# Patient Record
Sex: MALE | Race: BLACK OR AFRICAN AMERICAN | NOT HISPANIC OR LATINO | URBAN - METROPOLITAN AREA
[De-identification: names, ages, dates, MRNs, and addresses within clinical notes are randomized per-mention and may not be internally consistent; named-entity substitution may affect disease eponyms.]

---

## 2018-01-18 ENCOUNTER — INPATIENT (INPATIENT)
Facility: HOSPITAL | Age: 58
LOS: 6 days | Discharge: HOME | End: 2018-01-25
Attending: INTERNAL MEDICINE | Admitting: INTERNAL MEDICINE
Payer: COMMERCIAL

## 2018-01-18 VITALS
WEIGHT: 164.91 LBS | DIASTOLIC BLOOD PRESSURE: 61 MMHG | OXYGEN SATURATION: 95 % | TEMPERATURE: 98 F | HEIGHT: 66 IN | SYSTOLIC BLOOD PRESSURE: 119 MMHG | RESPIRATION RATE: 16 BRPM | HEART RATE: 99 BPM

## 2018-01-18 DIAGNOSIS — J02.9 ACUTE PHARYNGITIS, UNSPECIFIED: ICD-10-CM

## 2018-01-18 DIAGNOSIS — E11.9 TYPE 2 DIABETES MELLITUS WITHOUT COMPLICATIONS: ICD-10-CM

## 2018-01-18 DIAGNOSIS — N17.9 ACUTE KIDNEY FAILURE, UNSPECIFIED: ICD-10-CM

## 2018-01-18 DIAGNOSIS — I10 ESSENTIAL (PRIMARY) HYPERTENSION: ICD-10-CM

## 2018-01-18 LAB
ACETONE SERPL-MCNC: ABNORMAL
ALBUMIN SERPL ELPH-MCNC: 3.2 G/DL — LOW (ref 3.3–5)
ALP SERPL-CCNC: 99 U/L — SIGNIFICANT CHANGE UP (ref 40–120)
ALT FLD-CCNC: 61 U/L — SIGNIFICANT CHANGE UP (ref 12–78)
AMYLASE P1 CFR SERPL: 26 U/L — SIGNIFICANT CHANGE UP (ref 25–115)
ANION GAP SERPL CALC-SCNC: 17 MMOL/L — SIGNIFICANT CHANGE UP (ref 5–17)
ANISOCYTOSIS BLD QL: SIGNIFICANT CHANGE UP
APPEARANCE UR: CLEAR — SIGNIFICANT CHANGE UP
APTT BLD: 27.8 SEC — SIGNIFICANT CHANGE UP (ref 27.5–37.4)
AST SERPL-CCNC: 19 U/L — SIGNIFICANT CHANGE UP (ref 15–37)
BASOPHILS # BLD AUTO: 0 K/UL — SIGNIFICANT CHANGE UP (ref 0–0.2)
BASOPHILS NFR BLD AUTO: 0.3 % — SIGNIFICANT CHANGE UP (ref 0–2)
BILIRUB DIRECT SERPL-MCNC: 0.21 MG/DL — HIGH (ref 0.05–0.2)
BILIRUB INDIRECT FLD-MCNC: 0.5 MG/DL — SIGNIFICANT CHANGE UP (ref 0.2–1)
BILIRUB SERPL-MCNC: 0.7 MG/DL — SIGNIFICANT CHANGE UP (ref 0.2–1.2)
BILIRUB UR-MCNC: NEGATIVE — SIGNIFICANT CHANGE UP
BUN SERPL-MCNC: 24 MG/DL — HIGH (ref 7–23)
CALCIUM SERPL-MCNC: 9.6 MG/DL — SIGNIFICANT CHANGE UP (ref 8.5–10.1)
CHLORIDE SERPL-SCNC: 111 MMOL/L — HIGH (ref 96–108)
CO2 SERPL-SCNC: 23 MMOL/L — SIGNIFICANT CHANGE UP (ref 22–31)
COLOR SPEC: YELLOW — SIGNIFICANT CHANGE UP
CREAT SERPL-MCNC: 1.64 MG/DL — HIGH (ref 0.5–1.3)
DIFF PNL FLD: ABNORMAL
EOSINOPHIL # BLD AUTO: 0 K/UL — SIGNIFICANT CHANGE UP (ref 0–0.5)
EOSINOPHIL NFR BLD AUTO: 0 % — SIGNIFICANT CHANGE UP (ref 0–6)
FLUAV SPEC QL CULT: NEGATIVE — SIGNIFICANT CHANGE UP
FLUBV AG SPEC QL IA: NEGATIVE — SIGNIFICANT CHANGE UP
GLUCOSE BLDC GLUCOMTR-MCNC: 334 MG/DL — HIGH (ref 70–99)
GLUCOSE BLDC GLUCOMTR-MCNC: 358 MG/DL — HIGH (ref 70–99)
GLUCOSE BLDC GLUCOMTR-MCNC: 438 MG/DL — HIGH (ref 70–99)
GLUCOSE BLDC GLUCOMTR-MCNC: 549 MG/DL — CRITICAL HIGH (ref 70–99)
GLUCOSE SERPL-MCNC: 512 MG/DL — CRITICAL HIGH (ref 70–99)
GLUCOSE UR QL: 1000 MG/DL
HCT VFR BLD CALC: 49 % — SIGNIFICANT CHANGE UP (ref 39–50)
HGB BLD-MCNC: 15.4 G/DL — SIGNIFICANT CHANGE UP (ref 13–17)
HIV 1 & 2 AB SERPL IA.RAPID: SIGNIFICANT CHANGE UP
INR BLD: 1.1 RATIO — SIGNIFICANT CHANGE UP (ref 0.88–1.16)
KETONES UR-MCNC: ABNORMAL
LEUKOCYTE ESTERASE UR-ACNC: NEGATIVE — SIGNIFICANT CHANGE UP
LG PLATELETS BLD QL AUTO: SLIGHT — SIGNIFICANT CHANGE UP
LIDOCAIN IGE QN: 166 U/L — SIGNIFICANT CHANGE UP (ref 73–393)
LYMPHOCYTES # BLD AUTO: 0.7 K/UL — LOW (ref 1–3.3)
LYMPHOCYTES # BLD AUTO: 5.9 % — LOW (ref 13–44)
MACROCYTES BLD QL: SIGNIFICANT CHANGE UP
MCHC RBC-ENTMCNC: 22.5 PG — LOW (ref 27–34)
MCHC RBC-ENTMCNC: 31.5 GM/DL — LOW (ref 32–36)
MCV RBC AUTO: 71.5 FL — LOW (ref 80–100)
MONOCYTES # BLD AUTO: 0.6 K/UL — SIGNIFICANT CHANGE UP (ref 0–0.9)
MONOCYTES NFR BLD AUTO: 5.1 % — SIGNIFICANT CHANGE UP (ref 2–14)
NEUTROPHILS # BLD AUTO: 11.1 K/UL — HIGH (ref 1.8–7.4)
NEUTROPHILS NFR BLD AUTO: 88.6 % — HIGH (ref 43–77)
NITRITE UR-MCNC: NEGATIVE — SIGNIFICANT CHANGE UP
PH UR: 5 — SIGNIFICANT CHANGE UP (ref 5–8)
PLAT MORPH BLD: NORMAL — SIGNIFICANT CHANGE UP
PLATELET # BLD AUTO: 419 K/UL — HIGH (ref 150–400)
POLYCHROMASIA BLD QL SMEAR: SLIGHT — SIGNIFICANT CHANGE UP
POTASSIUM SERPL-MCNC: 4.8 MMOL/L — SIGNIFICANT CHANGE UP (ref 3.5–5.3)
POTASSIUM SERPL-SCNC: 4.8 MMOL/L — SIGNIFICANT CHANGE UP (ref 3.5–5.3)
PROT SERPL-MCNC: 9.3 GM/DL — HIGH (ref 6–8.3)
PROT UR-MCNC: 30 MG/DL
PROTHROM AB SERPL-ACNC: 12 SEC — SIGNIFICANT CHANGE UP (ref 9.8–12.7)
RBC # BLD: 6.85 M/UL — HIGH (ref 4.2–5.8)
RBC # FLD: 12.7 % — SIGNIFICANT CHANGE UP (ref 11–15)
RBC BLD AUTO: ABNORMAL
RBC CASTS # UR COMP ASSIST: ABNORMAL /HPF (ref 0–4)
SODIUM SERPL-SCNC: 151 MMOL/L — HIGH (ref 135–145)
SP GR SPEC: 1.01 — SIGNIFICANT CHANGE UP (ref 1.01–1.02)
UROBILINOGEN FLD QL: NEGATIVE MG/DL — SIGNIFICANT CHANGE UP
WBC # BLD: 12.5 K/UL — HIGH (ref 3.8–10.5)
WBC # FLD AUTO: 12.5 K/UL — HIGH (ref 3.8–10.5)

## 2018-01-18 PROCEDURE — 99223 1ST HOSP IP/OBS HIGH 75: CPT

## 2018-01-18 PROCEDURE — 93010 ELECTROCARDIOGRAM REPORT: CPT

## 2018-01-18 PROCEDURE — 99285 EMERGENCY DEPT VISIT HI MDM: CPT

## 2018-01-18 PROCEDURE — 71045 X-RAY EXAM CHEST 1 VIEW: CPT | Mod: 26

## 2018-01-18 RX ORDER — INSULIN LISPRO 100/ML
VIAL (ML) SUBCUTANEOUS
Qty: 0 | Refills: 0 | Status: DISCONTINUED | OUTPATIENT
Start: 2018-01-18 | End: 2018-01-19

## 2018-01-18 RX ORDER — SODIUM CHLORIDE 9 MG/ML
1000 INJECTION INTRAMUSCULAR; INTRAVENOUS; SUBCUTANEOUS ONCE
Qty: 0 | Refills: 0 | Status: COMPLETED | OUTPATIENT
Start: 2018-01-18 | End: 2018-01-18

## 2018-01-18 RX ORDER — DEXTROSE 50 % IN WATER 50 %
1 SYRINGE (ML) INTRAVENOUS ONCE
Qty: 0 | Refills: 0 | Status: DISCONTINUED | OUTPATIENT
Start: 2018-01-18 | End: 2018-01-25

## 2018-01-18 RX ORDER — HEPARIN SODIUM 5000 [USP'U]/ML
5000 INJECTION INTRAVENOUS; SUBCUTANEOUS EVERY 8 HOURS
Qty: 0 | Refills: 0 | Status: DISCONTINUED | OUTPATIENT
Start: 2018-01-18 | End: 2018-01-18

## 2018-01-18 RX ORDER — INSULIN HUMAN 100 [IU]/ML
14 INJECTION, SOLUTION SUBCUTANEOUS ONCE
Qty: 0 | Refills: 0 | Status: COMPLETED | OUTPATIENT
Start: 2018-01-18 | End: 2018-01-18

## 2018-01-18 RX ORDER — INSULIN HUMAN 100 [IU]/ML
12 INJECTION, SOLUTION SUBCUTANEOUS ONCE
Qty: 0 | Refills: 0 | Status: COMPLETED | OUTPATIENT
Start: 2018-01-18 | End: 2018-01-18

## 2018-01-18 RX ORDER — SODIUM CHLORIDE 9 MG/ML
1000 INJECTION INTRAMUSCULAR; INTRAVENOUS; SUBCUTANEOUS
Qty: 0 | Refills: 0 | Status: DISCONTINUED | OUTPATIENT
Start: 2018-01-18 | End: 2018-01-19

## 2018-01-18 RX ORDER — SODIUM CHLORIDE 9 MG/ML
2400 INJECTION INTRAMUSCULAR; INTRAVENOUS; SUBCUTANEOUS ONCE
Qty: 0 | Refills: 0 | Status: COMPLETED | OUTPATIENT
Start: 2018-01-18 | End: 2018-01-18

## 2018-01-18 RX ORDER — SODIUM CHLORIDE 9 MG/ML
1000 INJECTION, SOLUTION INTRAVENOUS
Qty: 0 | Refills: 0 | Status: DISCONTINUED | OUTPATIENT
Start: 2018-01-18 | End: 2018-01-25

## 2018-01-18 RX ORDER — AMLODIPINE BESYLATE 2.5 MG/1
2.5 TABLET ORAL DAILY
Qty: 0 | Refills: 0 | Status: DISCONTINUED | OUTPATIENT
Start: 2018-01-18 | End: 2018-01-25

## 2018-01-18 RX ORDER — DEXTROSE 50 % IN WATER 50 %
25 SYRINGE (ML) INTRAVENOUS ONCE
Qty: 0 | Refills: 0 | Status: DISCONTINUED | OUTPATIENT
Start: 2018-01-18 | End: 2018-01-25

## 2018-01-18 RX ORDER — DEXTROSE 50 % IN WATER 50 %
12.5 SYRINGE (ML) INTRAVENOUS ONCE
Qty: 0 | Refills: 0 | Status: DISCONTINUED | OUTPATIENT
Start: 2018-01-18 | End: 2018-01-25

## 2018-01-18 RX ORDER — ACETAMINOPHEN 500 MG
650 TABLET ORAL EVERY 6 HOURS
Qty: 0 | Refills: 0 | Status: DISCONTINUED | OUTPATIENT
Start: 2018-01-18 | End: 2018-01-25

## 2018-01-18 RX ORDER — GLUCAGON INJECTION, SOLUTION 0.5 MG/.1ML
1 INJECTION, SOLUTION SUBCUTANEOUS ONCE
Qty: 0 | Refills: 0 | Status: DISCONTINUED | OUTPATIENT
Start: 2018-01-18 | End: 2018-01-25

## 2018-01-18 RX ORDER — NYSTATIN 500MM UNIT
500000 POWDER (EA) MISCELLANEOUS
Qty: 0 | Refills: 0 | Status: DISCONTINUED | OUTPATIENT
Start: 2018-01-18 | End: 2018-01-25

## 2018-01-18 RX ORDER — INSULIN LISPRO 100/ML
VIAL (ML) SUBCUTANEOUS AT BEDTIME
Qty: 0 | Refills: 0 | Status: DISCONTINUED | OUTPATIENT
Start: 2018-01-18 | End: 2018-01-19

## 2018-01-18 RX ADMIN — SODIUM CHLORIDE 125 MILLILITER(S): 9 INJECTION INTRAMUSCULAR; INTRAVENOUS; SUBCUTANEOUS at 19:19

## 2018-01-18 RX ADMIN — INSULIN HUMAN 14 UNIT(S): 100 INJECTION, SOLUTION SUBCUTANEOUS at 15:10

## 2018-01-18 RX ADMIN — INSULIN HUMAN 12 UNIT(S): 100 INJECTION, SOLUTION SUBCUTANEOUS at 12:53

## 2018-01-18 RX ADMIN — AMLODIPINE BESYLATE 2.5 MILLIGRAM(S): 2.5 TABLET ORAL at 20:40

## 2018-01-18 RX ADMIN — Medication 4: at 20:40

## 2018-01-18 RX ADMIN — SODIUM CHLORIDE 1200 MILLILITER(S): 9 INJECTION INTRAMUSCULAR; INTRAVENOUS; SUBCUTANEOUS at 11:39

## 2018-01-18 RX ADMIN — Medication 500000 UNIT(S): at 23:53

## 2018-01-18 RX ADMIN — SODIUM CHLORIDE 1000 MILLILITER(S): 9 INJECTION INTRAMUSCULAR; INTRAVENOUS; SUBCUTANEOUS at 14:20

## 2018-01-18 NOTE — H&P ADULT - NSHPREVIEWOFSYSTEMS_GEN_ALL_CORE
Constitutional: +malaise   Skin: No rash.  Eyes: No recent vision problems or eye pain.  ENT: +sore throat, +congestion   Endocrine: No thyroid problems.  Cardiovascular: No chest pain or palpitations.  Respiratory: +productive cough, No shortness of breath  Gastrointestinal: +dark stools, No abdominal pain, nausea, vomiting, or diarrhea.  Genitourinary: No dysuria.  Musculoskeletal: No joint swelling.  Neurologic: No headache.

## 2018-01-18 NOTE — H&P ADULT - ASSESSMENT
58 y/o male with a recent diagnosis of HTN presents with sore throat and generalized weakness x 2 weeks likely secondary to bacterial pharyngitis. 58 y/o male with a recent diagnosis of HTN presents with sore throat and generalized weakness x 2 weeks likely secondary to bacterial pharyngitis.     IMPROVE VTE Individual Risk Assessment        RISK                                                          Points  [  ] Previous VTE                                                3  [  ] Thrombophilia                                             2  [  ] Lower limb paralysis                                   2        (unable to hold up >15 seconds)    [  ] Current Cancer                                            2         (within 6 months)  [  ] Immobilization > 24 hrs                              1  [  ] ICU/CCU stay > 24 hours                            1  [  ] Age > 60                                                    1  IMPROVE VTE Score _______0__

## 2018-01-18 NOTE — H&P ADULT - PROBLEM SELECTOR PLAN 1
- Admit to Medicine  - check BCx and UCx  - Start IV Levaquin 500mg - Admit to Medicine  - check BCx and UCx  - Rapid flu negative  - check RVP  - Start IV Levaquin 500mg

## 2018-01-18 NOTE — ED PROVIDER NOTE - ENMT, MLM
Airway patent, Nasal mucosa clear. Mouth with dry mucosa, +oral thrush. Throat has no vesicles, no oropharyngeal exudates and uvula is midline.

## 2018-01-18 NOTE — ED ADULT NURSE NOTE - OBJECTIVE STATEMENT
PT presented to the ed c/o of weakness and cold like symptoms x 2 weeks. Pt reported that he is been flu + and being treated for that. reported yellowish sticky mucus and sorethroat and dry mouth.

## 2018-01-18 NOTE — H&P ADULT - HISTORY OF PRESENT ILLNESS
56 y/o male with a recent diagnosis of HTN presents to ER c/o sore throat, flu-like symptoms and generalized weakness x 2 weeks. He saw his PMD about 1 week ago who prescribed him Ibuprofen and cough medicine OTC. He reports feeling worse than before with loss of appetite and greenish sputum. CXR clear. He states he was recently started on Amlodipine 2.5mg but does not really take it everyday. He denies any chest pain, SOB, palpitations or dizziness. No fever, chills, dysuria, abdominal pain, or n/v/d. +Sick contact at home (son).     In ER, he was found with blood sugar 512, +moderate acetone, +ketones in urine, and Cr 1.64

## 2018-01-18 NOTE — ED PROVIDER NOTE - OBJECTIVE STATEMENT
57 year old male presents today c/o two week history of generalized weakness, pt states that he did see his PMD thinking he had the flu, he was given cough medication, but c/o still not getting better (-) fevers or chills  (-) nausea or vomiting  (-) abdominal pains +sore throat +increased thirst +polyuria

## 2018-01-18 NOTE — H&P ADULT - PROBLEM SELECTOR PLAN 2
- new onset diabetes  - check premeal finger sticks and sliding scale coverage  - check A1C in AM  -

## 2018-01-18 NOTE — H&P ADULT - NSHPPHYSICALEXAM_GEN_ALL_CORE
GENERAL APPEARANCE: Well developed, well nourished, alert and cooperative, and appears to be in no acute distress.  HEAD: normocephalic.  EYES: PERRL, EOMI.   EARS: External auditory canals and tympanic membranes clear, hearing grossly intact.  Throat: +Uvula redness and whitish spots likely thrush   NECK: Neck supple  CARDIAC: S1, S2 regular, Rhythm is regular. There is no peripheral edema, cyanosis or pallor. Extremities are warm and well perfused. Capillary refill is less than 2 seconds. No carotid bruits.  LUNGS: decreased BS b/l  ABDOMEN: Positive bowel sounds. Soft, nondistended, nontender. No guarding or rebound. No masses.  EXTREMITIES: No significant deformity or joint abnormality. No edema. Peripheral pulses intact.   LOWER EXTREMITY: Examination of both feet reveals all toes to be normal in size and symmetry, normal range of motion, normal sensation with distal capillary filling of less than 2 seconds without tenderness, swelling, discoloration, nodules, weakness or deformity; examination of both ankles, knees, legs, and hips reveals normal range of motion, normal sensation without tenderness, swelling, discoloration, crepitus, weakness or deformity.  NEUROLOGICAL: CN II-XII intact. Strength and sensation symmetric and intact throughout.    SKIN: Skin normal color, texture and turgor with no lesions or eruptions.  PSYCHIATRIC: The mental examination revealed the patient was oriented to person, place, and time. The patient was able to demonstrate good judgement and reason, without hallucinations, abnormal affect or abnormal behaviors during the examination. Patient is not suicidal.

## 2018-01-18 NOTE — ED PROVIDER NOTE - CARE PLAN
Principal Discharge DX:	Diabetes  Secondary Diagnosis:	Oral thrush  Secondary Diagnosis:	Dehydration

## 2018-01-18 NOTE — ED ADULT TRIAGE NOTE - CHIEF COMPLAINT QUOTE
generalize weakness for 2 weeks worsening. Pt was treated for flu like symptoms 2 weeks ago. Pt c/o feeling dehydrated

## 2018-01-19 DIAGNOSIS — E87.0 HYPEROSMOLALITY AND HYPERNATREMIA: ICD-10-CM

## 2018-01-19 DIAGNOSIS — B37.0 CANDIDAL STOMATITIS: ICD-10-CM

## 2018-01-19 DIAGNOSIS — E11.65 TYPE 2 DIABETES MELLITUS WITH HYPERGLYCEMIA: ICD-10-CM

## 2018-01-19 LAB
ACETONE SERPL-MCNC: ABNORMAL
ACETONE SERPL-MCNC: NEGATIVE — SIGNIFICANT CHANGE UP
ANION GAP SERPL CALC-SCNC: 7 MMOL/L — SIGNIFICANT CHANGE UP (ref 5–17)
ANION GAP SERPL CALC-SCNC: 9 MMOL/L — SIGNIFICANT CHANGE UP (ref 5–17)
BASE EXCESS BLDV CALC-SCNC: 1.2 MMOL/L — SIGNIFICANT CHANGE UP (ref -2–2)
BASOPHILS # BLD AUTO: 0.1 K/UL — SIGNIFICANT CHANGE UP (ref 0–0.2)
BASOPHILS NFR BLD AUTO: 0.9 % — SIGNIFICANT CHANGE UP (ref 0–2)
BLOOD GAS COMMENTS, VENOUS: SIGNIFICANT CHANGE UP
BUN SERPL-MCNC: 21 MG/DL — SIGNIFICANT CHANGE UP (ref 7–23)
BUN SERPL-MCNC: 27 MG/DL — HIGH (ref 7–23)
CALCIUM SERPL-MCNC: 8.2 MG/DL — LOW (ref 8.5–10.1)
CALCIUM SERPL-MCNC: 9 MG/DL — SIGNIFICANT CHANGE UP (ref 8.5–10.1)
CHLORIDE SERPL-SCNC: 117 MMOL/L — HIGH (ref 96–108)
CHLORIDE SERPL-SCNC: 123 MMOL/L — HIGH (ref 96–108)
CHOLEST SERPL-MCNC: 175 MG/DL — SIGNIFICANT CHANGE UP (ref 10–199)
CO2 SERPL-SCNC: 28 MMOL/L — SIGNIFICANT CHANGE UP (ref 22–31)
CO2 SERPL-SCNC: 29 MMOL/L — SIGNIFICANT CHANGE UP (ref 22–31)
CREAT SERPL-MCNC: 1.05 MG/DL — SIGNIFICANT CHANGE UP (ref 0.5–1.3)
CREAT SERPL-MCNC: 1.56 MG/DL — HIGH (ref 0.5–1.3)
CULTURE RESULTS: SIGNIFICANT CHANGE UP
EOSINOPHIL # BLD AUTO: 0 K/UL — SIGNIFICANT CHANGE UP (ref 0–0.5)
EOSINOPHIL NFR BLD AUTO: 0 % — SIGNIFICANT CHANGE UP (ref 0–6)
GAS PNL BLDV: SIGNIFICANT CHANGE UP
GLUCOSE BLDC GLUCOMTR-MCNC: 100 MG/DL — HIGH (ref 70–99)
GLUCOSE BLDC GLUCOMTR-MCNC: 122 MG/DL — HIGH (ref 70–99)
GLUCOSE BLDC GLUCOMTR-MCNC: 155 MG/DL — HIGH (ref 70–99)
GLUCOSE BLDC GLUCOMTR-MCNC: 214 MG/DL — HIGH (ref 70–99)
GLUCOSE BLDC GLUCOMTR-MCNC: 380 MG/DL — HIGH (ref 70–99)
GLUCOSE BLDC GLUCOMTR-MCNC: 445 MG/DL — HIGH (ref 70–99)
GLUCOSE BLDC GLUCOMTR-MCNC: 457 MG/DL — CRITICAL HIGH (ref 70–99)
GLUCOSE BLDC GLUCOMTR-MCNC: 533 MG/DL — CRITICAL HIGH (ref 70–99)
GLUCOSE BLDC GLUCOMTR-MCNC: 544 MG/DL — CRITICAL HIGH (ref 70–99)
GLUCOSE BLDC GLUCOMTR-MCNC: 548 MG/DL — CRITICAL HIGH (ref 70–99)
GLUCOSE BLDC GLUCOMTR-MCNC: 558 MG/DL — CRITICAL HIGH (ref 70–99)
GLUCOSE BLDC GLUCOMTR-MCNC: 560 MG/DL — CRITICAL HIGH (ref 70–99)
GLUCOSE BLDC GLUCOMTR-MCNC: 78 MG/DL — SIGNIFICANT CHANGE UP (ref 70–99)
GLUCOSE BLDC GLUCOMTR-MCNC: 88 MG/DL — SIGNIFICANT CHANGE UP (ref 70–99)
GLUCOSE SERPL-MCNC: 550 MG/DL — CRITICAL HIGH (ref 70–99)
GLUCOSE SERPL-MCNC: 78 MG/DL — SIGNIFICANT CHANGE UP (ref 70–99)
HBA1C BLD-MCNC: >15.5 % — HIGH (ref 4–5.6)
HCO3 BLDV-SCNC: 28 MMOL/L — SIGNIFICANT CHANGE UP (ref 21–29)
HCT VFR BLD CALC: 41.2 % — SIGNIFICANT CHANGE UP (ref 39–50)
HDLC SERPL-MCNC: 50 MG/DL — SIGNIFICANT CHANGE UP (ref 40–125)
HGB BLD-MCNC: 12.5 G/DL — LOW (ref 13–17)
HOROWITZ INDEX BLDV+IHG-RTO: 21 — SIGNIFICANT CHANGE UP
LIPID PNL WITH DIRECT LDL SERPL: 102 MG/DL — SIGNIFICANT CHANGE UP
LYMPHOCYTES # BLD AUTO: 1.7 K/UL — SIGNIFICANT CHANGE UP (ref 1–3.3)
LYMPHOCYTES # BLD AUTO: 12.3 % — LOW (ref 13–44)
MAGNESIUM SERPL-MCNC: 2.4 MG/DL — SIGNIFICANT CHANGE UP (ref 1.6–2.6)
MAGNESIUM SERPL-MCNC: 2.5 MG/DL — SIGNIFICANT CHANGE UP (ref 1.6–2.6)
MCHC RBC-ENTMCNC: 21.3 PG — LOW (ref 27–34)
MCHC RBC-ENTMCNC: 30.3 GM/DL — LOW (ref 32–36)
MCV RBC AUTO: 70.2 FL — LOW (ref 80–100)
MONOCYTES # BLD AUTO: 1.3 K/UL — HIGH (ref 0–0.9)
MONOCYTES NFR BLD AUTO: 9.9 % — SIGNIFICANT CHANGE UP (ref 2–14)
NEUTROPHILS # BLD AUTO: 10.3 K/UL — HIGH (ref 1.8–7.4)
NEUTROPHILS NFR BLD AUTO: 76.8 % — SIGNIFICANT CHANGE UP (ref 43–77)
PCO2 BLDV: 58 MMHG — HIGH (ref 35–50)
PH BLDV: 7.31 — LOW (ref 7.35–7.45)
PHOSPHATE SERPL-MCNC: 1.2 MG/DL — LOW (ref 2.5–4.5)
PHOSPHATE SERPL-MCNC: 2.4 MG/DL — LOW (ref 2.5–4.5)
PLATELET # BLD AUTO: 378 K/UL — SIGNIFICANT CHANGE UP (ref 150–400)
PO2 BLDV: 50 MMHG — HIGH (ref 25–45)
POTASSIUM SERPL-MCNC: 3.2 MMOL/L — LOW (ref 3.5–5.3)
POTASSIUM SERPL-MCNC: 4.5 MMOL/L — SIGNIFICANT CHANGE UP (ref 3.5–5.3)
POTASSIUM SERPL-SCNC: 3.2 MMOL/L — LOW (ref 3.5–5.3)
POTASSIUM SERPL-SCNC: 4.5 MMOL/L — SIGNIFICANT CHANGE UP (ref 3.5–5.3)
RBC # BLD: 5.87 M/UL — HIGH (ref 4.2–5.8)
RBC # FLD: 12.8 % — SIGNIFICANT CHANGE UP (ref 11–15)
SAO2 % BLDV: 80 % — SIGNIFICANT CHANGE UP (ref 67–88)
SODIUM SERPL-SCNC: 154 MMOL/L — HIGH (ref 135–145)
SODIUM SERPL-SCNC: 159 MMOL/L — HIGH (ref 135–145)
SPECIMEN SOURCE: SIGNIFICANT CHANGE UP
T3 SERPL-MCNC: 71 NG/DL — LOW (ref 80–200)
T4 AB SER-ACNC: 5.4 UG/DL — SIGNIFICANT CHANGE UP (ref 4.6–12)
TOTAL CHOLESTEROL/HDL RATIO MEASUREMENT: 3.5 RATIO — SIGNIFICANT CHANGE UP (ref 3.4–9.6)
TRIGL SERPL-MCNC: 113 MG/DL — SIGNIFICANT CHANGE UP (ref 10–149)
TROPONIN I SERPL-MCNC: 0.03 NG/ML — SIGNIFICANT CHANGE UP (ref 0.01–0.04)
TROPONIN I SERPL-MCNC: 0.03 NG/ML — SIGNIFICANT CHANGE UP (ref 0.01–0.04)
TSH SERPL-MCNC: 0.12 UU/ML — LOW (ref 0.36–3.74)
WBC # BLD: 13.4 K/UL — HIGH (ref 3.8–10.5)
WBC # FLD AUTO: 13.4 K/UL — HIGH (ref 3.8–10.5)

## 2018-01-19 PROCEDURE — 99233 SBSQ HOSP IP/OBS HIGH 50: CPT

## 2018-01-19 RX ORDER — INSULIN HUMAN 100 [IU]/ML
7 INJECTION, SOLUTION SUBCUTANEOUS
Qty: 0 | Refills: 0 | Status: DISCONTINUED | OUTPATIENT
Start: 2018-01-19 | End: 2018-01-25

## 2018-01-19 RX ORDER — INSULIN LISPRO 100/ML
VIAL (ML) SUBCUTANEOUS
Qty: 0 | Refills: 0 | Status: DISCONTINUED | OUTPATIENT
Start: 2018-01-19 | End: 2018-01-25

## 2018-01-19 RX ORDER — SODIUM CHLORIDE 9 MG/ML
1000 INJECTION INTRAMUSCULAR; INTRAVENOUS; SUBCUTANEOUS ONCE
Qty: 0 | Refills: 0 | Status: COMPLETED | OUTPATIENT
Start: 2018-01-19 | End: 2018-01-19

## 2018-01-19 RX ORDER — INSULIN HUMAN 100 [IU]/ML
10 INJECTION, SOLUTION SUBCUTANEOUS ONCE
Qty: 0 | Refills: 0 | Status: COMPLETED | OUTPATIENT
Start: 2018-01-19 | End: 2018-01-19

## 2018-01-19 RX ORDER — INSULIN GLARGINE 100 [IU]/ML
20 INJECTION, SOLUTION SUBCUTANEOUS AT BEDTIME
Qty: 0 | Refills: 0 | Status: DISCONTINUED | OUTPATIENT
Start: 2018-01-19 | End: 2018-01-25

## 2018-01-19 RX ORDER — INSULIN GLARGINE 100 [IU]/ML
20 INJECTION, SOLUTION SUBCUTANEOUS ONCE
Qty: 0 | Refills: 0 | Status: COMPLETED | OUTPATIENT
Start: 2018-01-19 | End: 2018-01-19

## 2018-01-19 RX ORDER — SODIUM CHLORIDE 9 MG/ML
1000 INJECTION INTRAMUSCULAR; INTRAVENOUS; SUBCUTANEOUS
Qty: 0 | Refills: 0 | Status: DISCONTINUED | OUTPATIENT
Start: 2018-01-19 | End: 2018-01-19

## 2018-01-19 RX ORDER — SODIUM CHLORIDE 9 MG/ML
1000 INJECTION, SOLUTION INTRAVENOUS
Qty: 0 | Refills: 0 | Status: DISCONTINUED | OUTPATIENT
Start: 2018-01-19 | End: 2018-01-20

## 2018-01-19 RX ORDER — INSULIN LISPRO 100/ML
VIAL (ML) SUBCUTANEOUS
Qty: 0 | Refills: 0 | Status: DISCONTINUED | OUTPATIENT
Start: 2018-01-19 | End: 2018-01-19

## 2018-01-19 RX ORDER — PNEUMOCOCCAL 23-VAL P-SAC VAC 25MCG/0.5
0.5 VIAL (ML) INJECTION ONCE
Qty: 0 | Refills: 0 | Status: COMPLETED | OUTPATIENT
Start: 2018-01-19 | End: 2018-01-19

## 2018-01-19 RX ADMIN — Medication 2: at 04:56

## 2018-01-19 RX ADMIN — Medication 12: at 21:21

## 2018-01-19 RX ADMIN — Medication 12: at 17:30

## 2018-01-19 RX ADMIN — Medication 500000 UNIT(S): at 21:22

## 2018-01-19 RX ADMIN — INSULIN GLARGINE 20 UNIT(S): 100 INJECTION, SOLUTION SUBCUTANEOUS at 21:21

## 2018-01-19 RX ADMIN — Medication 500000 UNIT(S): at 06:51

## 2018-01-19 RX ADMIN — AMLODIPINE BESYLATE 2.5 MILLIGRAM(S): 2.5 TABLET ORAL at 05:40

## 2018-01-19 RX ADMIN — INSULIN HUMAN 10 UNIT(S): 100 INJECTION, SOLUTION SUBCUTANEOUS at 01:30

## 2018-01-19 RX ADMIN — Medication 4: at 04:00

## 2018-01-19 RX ADMIN — Medication 4: at 00:09

## 2018-01-19 RX ADMIN — SODIUM CHLORIDE 125 MILLILITER(S): 9 INJECTION, SOLUTION INTRAVENOUS at 17:55

## 2018-01-19 RX ADMIN — SODIUM CHLORIDE 150 MILLILITER(S): 9 INJECTION INTRAMUSCULAR; INTRAVENOUS; SUBCUTANEOUS at 04:01

## 2018-01-19 RX ADMIN — SODIUM CHLORIDE 2000 MILLILITER(S): 9 INJECTION INTRAMUSCULAR; INTRAVENOUS; SUBCUTANEOUS at 01:53

## 2018-01-19 RX ADMIN — INSULIN GLARGINE 20 UNIT(S): 100 INJECTION, SOLUTION SUBCUTANEOUS at 03:46

## 2018-01-19 NOTE — PROGRESS NOTE ADULT - SUBJECTIVE AND OBJECTIVE BOX
CHIEF COMPLAINT/INTERVAL HISTORY:    Patient is a 57y old  Male who presents with a chief complaint of sore throat and general weakness (2018 19:31)      HPI:  58 y/o male with a recent diagnosis of HTN presents to ER c/o sore throat, flu-like symptoms and generalized weakness x 2 weeks. He saw his PMD about 1 week ago who prescribed him Ibuprofen and cough medicine OTC. He reports feeling worse than before with loss of appetite and greenish sputum. CXR clear. He states he was recently started on Amlodipine 2.5mg but does not really take it everyday. He denies any chest pain, SOB, palpitations or dizziness. No fever, chills, dysuria, abdominal pain, or n/v/d. +Sick contact at home (son).     In ER, he was found with blood sugar 512, +moderate acetone, +ketones in urine, and Cr 1.64 (2018 19:31)    Overnight issues  patient still complain of sore throat   feeling stronger   SUBJECTIVE & OBJECTIVE: Pt seen and examined at bedside.   ROS:  CONSTITUTIONAL: No fever, weight loss, or fatigue  NECK: No pain or stiffness POSITIVE sore throat   RESPIRATORY: No cough, wheezing, chills or hemoptysis; No shortness of breath  CARDIOVASCULAR: No chest pain, palpitations, dizziness, or leg swelling  GASTROINTESTINAL: No abdominal or epigastric pain. No nausea, vomiting, or hematemesis; No diarrhea or constipation. No melena or hematochezia.  GENITOURINARY: No dysuria, frequency, hematuria, or incontinence  NEUROLOGICAL: No headaches, memory loss, loss of strength, numbness, or tremors  SKIN: No itching, burning, rashes, or lesions   ICU Vital Signs Last 24 Hrs  T(C): 36.2 (2018 05:44), Max: 37.1 (2018 19:39)  T(F): 97.2 (2018 05:44), Max: 98.7 (2018 19:39)  HR: 62 (2018 05:44) (62 - 99)  BP: 137/75 (2018 05:44) (119/61 - 168/92)  BP(mean): --  ABP: --  ABP(mean): --  RR: 16 (2018 05:44) (16 - 18)  SpO2: 95% (2018 05:44) (93% - 96%)        MEDICATIONS  (STANDING):  amLODIPine   Tablet 2.5 milliGRAM(s) Oral daily  dextrose 5%. 1000 milliLiter(s) (50 mL/Hr) IV Continuous <Continuous>  dextrose 50% Injectable 12.5 Gram(s) IV Push once  dextrose 50% Injectable 25 Gram(s) IV Push once  dextrose 50% Injectable 25 Gram(s) IV Push once  insulin glargine Injectable (LANTUS) 20 Unit(s) SubCutaneous at bedtime  insulin lispro (HumaLOG) corrective regimen sliding scale   SubCutaneous every 1 hour  levoFLOXacin IVPB      levoFLOXacin IVPB 500 milliGRAM(s) IV Intermittent every 24 hours  nystatin    Suspension 878983 Unit(s) Oral two times a day  pneumococcal  23 Vaccine (PNEUMOVAX) 0.5 milliLiter(s) IntraMuscular once  sodium chloride 0.45%. 1000 milliLiter(s) (125 mL/Hr) IV Continuous <Continuous>    MEDICATIONS  (PRN):  acetaminophen   Tablet 650 milliGRAM(s) Oral every 6 hours PRN For Temp greater than 38 C (100.4 F)  acetaminophen   Tablet. 650 milliGRAM(s) Oral every 6 hours PRN Mild Pain (1 - 3)  dextrose Gel 1 Dose(s) Oral once PRN Blood Glucose LESS THAN 70 milliGRAM(s)/deciliter  glucagon  Injectable 1 milliGRAM(s) IntraMuscular once PRN Glucose LESS THAN 70 milligrams/deciliter        PHYSICAL EXAM:    GENERAL: NAD, well-groomed, well-developed  HEAD:  Atraumatic, Normocephalic  EYES: EOMI, PERRLA, conjunctiva and sclera clear  ENMT: Moist mucous membranes pharngeal erythema no adenopathy  NECK: Supple, No JVD  NERVOUS SYSTEM:  Alert & Oriented X3, Motor Strength 5/5 B/L upper and lower extremities; DTRs 2+ intact and symmetric  CHEST/LUNG: Clear to auscultation bilaterally; No rales, rhonchi, wheezing, or rubs  HEART: Regular rate and rhythm; No murmurs, rubs, or gallops  ABDOMEN: Soft, Nontender, Nondistended; Bowel sounds present  EXTREMITIES:  2+ Peripheral Pulses, No clubbing, cyanosis, or edema    LABS:                        12.5   13.4  )-----------( 378      ( 2018 08:32 )             41.2         154<H>  |  117<H>  |  27<H>  ----------------------------<  550<HH>  4.5   |  28  |  1.56<H>    Ca    9.0      2018 01:16  Phos  2.4       Mg     2.4         TPro  9.3<H>  /  Alb  3.2<L>  /  TBili  0.7  /  DBili  .21<H>  /  AST  19  /  ALT  61  /  AlkPhos  99      PT/INR - ( 2018 11:35 )   PT: 12.0 sec;   INR: 1.10 ratio         PTT - ( 2018 11:35 )  PTT:27.8 sec  Urinalysis Basic - ( 2018 12:06 )    Color: Yellow / Appearance: Clear / S.015 / pH: x  Gluc: x / Ketone: Large  / Bili: Negative / Urobili: Negative mg/dL   Blood: x / Protein: 30 mg/dL / Nitrite: Negative   Leuk Esterase: Negative / RBC: 6-10 /HPF / WBC x   Sq Epi: x / Non Sq Epi: x / Bacteria: x        CAPILLARY BLOOD GLUCOSE      POCT Blood Glucose.: 78 mg/dL (2018 07:57)  POCT Blood Glucose.: 100 mg/dL (2018 06:54)  POCT Blood Glucose.: 122 mg/dL (2018 05:39)  POCT Blood Glucose.: 155 mg/dL (2018 04:55)  POCT Blood Glucose.: 214 mg/dL (2018 03:50)  POCT Blood Glucose.: 380 mg/dL (2018 02:32)  POCT Blood Glucose.: 560 mg/dL (2018 01:16)  POCT Blood Glucose.: 558 mg/dL (2018 01:14)  POCT Blood Glucose.: 548 mg/dL (2018 23:59)  POCT Blood Glucose.: 549 mg/dL (2018 23:56)  POCT Blood Glucose.: 334 mg/dL (2018 20:34)  POCT Blood Glucose.: 358 mg/dL (2018 15:53)  POCT Blood Glucose.: 438 mg/dL (2018 14:20)      RECENT CULTURES:      RADIOLOGY & ADDITIONAL TESTS:  Imaging Personally Reviewed:  [ ] YES      Consultant(s) Notes Reviewed:  [ ] YES     Care Discussed with [ ] Consultants [X ] Patient [ ] Family  [x ]    [x ]  Other; RN  HEALTH ISSUES - PROBLEM Dx:  Oral thrush: Oral thrush  Type 2 diabetes mellitus with hyperglycemia, without long-term current use of insulin: Type 2 diabetes mellitus with hyperglycemia, without long-term current use of insulin  Hypertension: Hypertension  PHILLIP (acute kidney injury): PHILLIP (acute kidney injury)  Diabetes: Diabetes  Pharyngitis with viral syndrome: Pharyngitis with viral syndrome        DVT/GI ppx  Discussed with pt @ bedside

## 2018-01-19 NOTE — CONSULT NOTE ADULT - SUBJECTIVE AND OBJECTIVE BOX
Patient is a 57y old  Male who presents with a chief complaint of sore throat and general weakness (18 Jan 2018 19:31)      Reason For Consult:  hyperglycemia     HPI:  58 y/o male with a recent diagnosis of HTN presents to ER c/o sore throat, flu-like symptoms and generalized weakness x 2 weeks. He saw his PMD about 1 week ago who prescribed him Ibuprofen and cough medicine OTC. He reports feeling worse than before with loss of appetite and greenish sputum. CXR clear. He states he was recently started on Amlodipine 2.5mg but does not really take it everyday. He denies any chest pain, SOB, palpitations or dizziness. No fever, chills, dysuria, abdominal pain, or n/v/d. +Sick contact at home (son).     In ER, he was found with blood sugar 512, +moderate acetone, +ketones in urine, and Cr 1.64 (18 Jan 2018 19:31)  patient was on insulin drip with better control   now again with finger sticks much increased   on Lantus 20 units and Lispro sliding scale coverage with meals   on Levaquin     PAST MEDICAL & SURGICAL HISTORY:  Hypertension  No significant past surgical history      FAMILY HISTORY:  No pertinent family history in first degree relatives        Social History:    MEDICATIONS  (STANDING):  amLODIPine   Tablet 2.5 milliGRAM(s) Oral daily  dextrose 5%. 1000 milliLiter(s) (50 mL/Hr) IV Continuous <Continuous>  dextrose 50% Injectable 12.5 Gram(s) IV Push once  dextrose 50% Injectable 25 Gram(s) IV Push once  dextrose 50% Injectable 25 Gram(s) IV Push once  insulin glargine Injectable (LANTUS) 20 Unit(s) SubCutaneous at bedtime  insulin lispro (HumaLOG) corrective regimen sliding scale   SubCutaneous Before meals and at bedtime  insulin regular  human recombinant 7 Unit(s) SubCutaneous before breakfast  insulin regular  human recombinant 7 Unit(s) SubCutaneous before lunch  insulin regular  human recombinant 7 Unit(s) SubCutaneous before dinner  levoFLOXacin IVPB      levoFLOXacin IVPB 500 milliGRAM(s) IV Intermittent every 24 hours  nystatin    Suspension [id number] Unit(s) Oral two times a day  pneumococcal  23 Vaccine (PNEUMOVAX) 0.5 milliLiter(s) IntraMuscular once  sodium chloride 0.45%. 1000 milliLiter(s) (125 mL/Hr) IV Continuous <Continuous>    MEDICATIONS  (PRN):  acetaminophen   Tablet 650 milliGRAM(s) Oral every 6 hours PRN For Temp greater than 38 C (100.4 F)  acetaminophen   Tablet. 650 milliGRAM(s) Oral every 6 hours PRN Mild Pain (1 - 3)  dextrose Gel 1 Dose(s) Oral once PRN Blood Glucose LESS THAN 70 milliGRAM(s)/deciliter  glucagon  Injectable 1 milliGRAM(s) IntraMuscular once PRN Glucose LESS THAN 70 milligrams/deciliter      REVIEW OF SYSTEMS:  CONSTITUTIONAL:  as per HPI  HEENT:  Eyes:  No diplopia or blurred vision. ENT:  No earache, sore throat or runny nose.  CARDIOVASCULAR:  No pressure, squeezing, strangling, tightness, heaviness or aching about the chest, neck, axilla or epigastrium.  RESPIRATORY:  No cough, shortness of breath, PND or orthopnea.  GASTROINTESTINAL:  No nausea, vomiting or diarrhea.  GENITOURINARY:  No dysuria, frequency or urgency. No Blood in urine  MUSCULOSKELETAL:  no joint aches, no muscle pain, myalgia  SKIN:  No change in skin, hair or nails.  NEUROLOGIC:  No paresthesias, fasciculations, seizures or weakness.  PSYCHIATRIC:  No disorder of thought or mood.  ENDOCRINE:  No heat or cold intolerance, polyuria or polydipsia. abnormal weight gain or loss, oral thrush  HEMATOLOGICAL:  No easy bruising or bleeding.     T(C): 36.7 (01-19-18 @ 17:26), Max: 37.1 (01-18-18 @ 23:26)  HR: 75 (01-19-18 @ 17:26) (62 - 75)  BP: 155/89 (01-19-18 @ 17:26) (137/75 - 164/83)  RR: 17 (01-19-18 @ 17:26) (16 - 18)  SpO2: 98% (01-19-18 @ 17:26) (93% - 98%)  Wt(kg): --    PHYSICAL EXAM:  GENERAL: NAD, well-groomed, well-developed  HEAD:  Atraumatic, Normocephalic  EYES: EOMI, PERRLA, conjunctiva and sclera clear  ENMT: No tonsillar erythema, exudates, or enlargement; Moist mucous membranes, Good dentition, No lesions  NECK: Supple, No JVD, Normal thyroid  NERVOUS SYSTEM:  Alert & Oriented X3, Good concentration; Motor Strength 5/5 B/L upper and lower extremities; DTRs 2+ intact and symmetric  CHEST/LUNG: Clear to percussion bilaterally; No rales, rhonchi, wheezing, or rubs  HEART: Regular rate and rhythm; No murmurs, rubs, or gallops  ABDOMEN: Soft, Nontender, Nondistended; Bowel sounds present  EXTREMITIES:  2+ Peripheral Pulses, No clubbing, cyanosis, or edema  LYMPH: No lymphadenopathy noted  SKIN: No rashes or lesions    CAPILLARY BLOOD GLUCOSE      POCT Blood Glucose.: 457 mg/dL (19 Jan 2018 21:07)  POCT Blood Glucose.: 445 mg/dL (19 Jan 2018 21:06)  POCT Blood Glucose.: 533 mg/dL (19 Jan 2018 16:55)  POCT Blood Glucose.: 544 mg/dL (19 Jan 2018 16:53)  POCT Blood Glucose.: 88 mg/dL (19 Jan 2018 09:12)  POCT Blood Glucose.: 78 mg/dL (19 Jan 2018 07:57)  POCT Blood Glucose.: 100 mg/dL (19 Jan 2018 06:54)  POCT Blood Glucose.: 122 mg/dL (19 Jan 2018 05:39)  POCT Blood Glucose.: 155 mg/dL (19 Jan 2018 04:55)  POCT Blood Glucose.: 214 mg/dL (19 Jan 2018 03:50)  POCT Blood Glucose.: 380 mg/dL (19 Jan 2018 02:32)  POCT Blood Glucose.: 560 mg/dL (19 Jan 2018 01:16)  POCT Blood Glucose.: 558 mg/dL (19 Jan 2018 01:14)  POCT Blood Glucose.: 548 mg/dL (18 Jan 2018 23:59)  POCT Blood Glucose.: 549 mg/dL (18 Jan 2018 23:56)                            12.5   13.4  )-----------( 378      ( 19 Jan 2018 08:32 )             41.2       CMP:  01-19 @ 08:32  SGPT --  Albumin --   Alk Phos --   Anion Gap 7   SGOT --   Total Bili --   BUN 21   Calcium Total 8.2   CO2 29   Chloride 123   Creatinine 1.05   eGFR if AA 92   eGFR if non AA 80   Glucose 78   Potassium 3.2   Protein --   Sodium 159      Thyroid Function Tests:  01-19 @ 09:50 TSH -- FreeT4 -- T3 71 Anti TPO -- Anti Thyroglobulin Ab -- TSI --  01-19 @ 08:32 TSH 0.123 FreeT4 -- T3 -- Anti TPO -- Anti Thyroglobulin Ab -- TSI --      Diabetes Tests: 01-19 @ 09:50 HbA1c >15.5 C-Peptide --       Parathyroids:     Adrenals:       Radiology:

## 2018-01-19 NOTE — ED ADULT NURSE REASSESSMENT NOTE - NS ED NURSE REASSESS COMMENT FT1
Patient , repeat 549, REBECCA Hutchinson made aware Patient , repeat 548, REBECCA Hutchinson made aware

## 2018-01-19 NOTE — CONSULT NOTE ADULT - PROBLEM SELECTOR RECOMMENDATION 9
Continue with the same regimen while inpatient   add prandial lispro 7 units   may need Metformin added as patient may have increased Insulin Resistance   Check C-Peptide before doing that   glucose toxicity increased   Thank You for the courtesy of this consultation !!!

## 2018-01-19 NOTE — CONSULT NOTE ADULT - SUBJECTIVE AND OBJECTIVE BOX
Patient is a 57y old  Male who presents with a chief complaint of sore throat and general weakness (2018 19:31)      HPI:  58 y/o male with a recent diagnosis of HTN presents to ER c/o sore throat, flu-like symptoms and generalized weakness x 2 weeks. He saw his PMD about 1 week ago who prescribed him Ibuprofen and cough medicine OTC. He reports feeling worse than before with loss of appetite and greenish sputum. CXR clear. He states he was recently started on Amlodipine 2.5mg but does not really take it everyday. He denies any chest pain, SOB, palpitations or dizziness. No fever, chills, dysuria, abdominal pain, or n/v/d. +Sick contact at home (son).     In ER, he was found with blood sugar 438, +moderate acetone, +ketones in urine, and Cr 1.64 (2018 19:31). Patient denies family history of diabetes or prior history of hyperglycemia. Patient received 2 liters normal saline and 10 units insulin IVP when consult called      Allergies    No Known Allergies            MEDICATIONS  (STANDING):  amLODIPine   Tablet 2.5 milliGRAM(s) Oral daily    insulin glargine Injectable (LANTUS) 20 Unit(s) SubCutaneous at bedtime ordered to start   evening  insulin glargine Injectable (LANTUS) 20 Unit(s) SubCutaneous once ordered stat now  insulin lispro (HumaLOG) corrective regimen sliding scale   SubCutaneous every 1 hour  insulin regular  human recombinant. 10 Unit(s) IV Push once ordered now  levoFLOXacin IVPB      levoFLOXacin IVPB 500 milliGRAM(s) IV Intermittent every 24 hours  nystatin    Suspension 729601 Unit(s) Oral two times a day  sodium chloride 0.9%. 1000 milliLiter(s) (150 mL/Hr) IV Continuous <Continuous>      Drug Dosing Weight  Height (cm): 167.64 (2018 10:13)  Weight (kg): 74.8 (2018 10:13)  BMI (kg/m2): 26.6 (2018 10:13)  BSA (m2): 1.84 (2018 10:13)    PAST MEDICAL & SURGICAL HISTORY:  Hypertension  No significant past surgical history      FAMILY HISTORY:  No pertinent family history in first degree relatives      SOCIAL HISTORY: nonsmoker    ADVANCE DIRECTIVES: none    REVIEW OF SYSTEMS      General:	malaise, denies weight loss    Skin/Breast: denies poorly healing wounds  	  Ophthalmologic: denies blurred vision  	  ENMT:	+ sore throat    Respiratory and Thorax: denies cough  	  Cardiovascular:	denies palpitations, denies chest pain    Gastrointestinal:	 denies N/v/d    Genitourinary:	denies    Neurological:	denies parasthesias      Endocrine:	increased thirst          ICU Vital Signs Last 24 Hrs  T(C): 36.6 (2018 01:43), Max: 37.1 (2018 19:39)  T(F): 97.9 (2018 01:43), Max: 98.7 (2018 19:39)  HR: 62 (2018 01:43) (62 - 99)  BP: 153/73 (2018 01:43) (119/61 - 168/92)  BP(mean): --  ABP: --  ABP(mean): --  RR: 18 (2018 01:43) (16 - 18)  SpO2: 93% (2018 01:43) (93% - 96%)    Current fingesrtick 538 mg/dl  Diet: strict carbohydrates      PHYSICAL EXAM:      Constitutional: alert and orientated, nontoxic in appearance, breathing non labored    Eyes:  EOMI, fundoscopic no acute changes    ENMT: pharyx with thrush    Neck: supple    Respiratory: bilateral air entry    Cardiovascular: S1/s2 EKG HR 64, normal axis, nonspecific ST changes III, AVF, V3    Gastrointestinal: benign      Extremities: FROM      Psychiatric: alert        LABS:  CBC Full  -  ( 2018 11:35 )  WBC Count : 12.5 K/uL  Hemoglobin : 15.4 g/dL  Hematocrit : 49.0 %  Platelet Count - Automated : 419 K/uL  Mean Cell Volume : 71.5 fl  Mean Cell Hemoglobin : 22.5 pg  Mean Cell Hemoglobin Concentration : 31.5 gm/dL  Auto Neutrophil # : 11.1 K/uL  Auto Lymphocyte # : 0.7 K/uL  Auto Monocyte # : 0.6 K/uL  Auto Eosinophil # : 0.0 K/uL  Auto Basophil # : 0.0 K/uL  Auto Neutrophil % : 88.6 %  Auto Lymphocyte % : 5.9 %  Auto Monocyte % : 5.1 %  Auto Eosinophil % : 0.0 %  Auto Basophil % : 0.3 %        154<H>  |  117<H>  |  27<H>  ----------------------------<  550<HH>  4.5   |  28  |  1.56<H>    Ca    9.0      2018 01:16  Phos  2.4       Mg     2.4         TPro  9.3<H>  /  Alb  3.2<L>  /  TBili  0.7  /  DBili  .21<H>  /  AST  19  /  ALT  61  /  AlkPhos  99      CAPILLARY BLOOD GLUCOSE      POCT Blood Glucose.: 560 mg/dL (2018 01:16)    lowest POCT Blood Glucose 334 mg/dl (2018  20:34)    PT/INR - ( 2018 11:35 )   PT: 12.0 sec;   INR: 1.10 ratio         PTT - ( 2018 11:35 )  PTT:27.8 sec  Urinalysis Basic - ( 2018 12:06 )    Color: Yellow / Appearance: Clear / S.015 / pH: x  Gluc: x / Ketone: Large  / Bili: Negative / Urobili: Negative mg/dL   Blood: x / Protein: 30 mg/dL / Nitrite: Negative   Leuk Esterase: Negative / RBC: 6-10 /HPF / WBC x   Sq Epi: x / Non Sq Epi: x / Bacteria: x    Venous BG 7.31 BE 1.2  HIV negative      RADIOLOGY: Chest Xray no acute infiltrates    CRITICAL CARE TIME SPENT: 30 minutes

## 2018-01-19 NOTE — CONSULT NOTE ADULT - PROBLEM SELECTOR RECOMMENDATION 9
Patient cleared for medical floor. FS q1h. Start Lantus, Continue IVF  New Onset. Obtain endocrine consult. Monitor Mg, PH q6h  Obtain Troponins for nonspecific ST changes  Pneumovax, flu vaccine prior to discharge

## 2018-01-19 NOTE — PROGRESS NOTE ADULT - ASSESSMENT
58 y/o male with a recent diagnosis of HTN presents with sore throat and generalized weakness x 2 weeks likely secondary to bacterial pharyngitis.     IMPROVE VTE Individual Risk Assessment        RISK                                                          Points  [  ] Previous VTE                                                3  [  ] Thrombophilia                                             2  [  ] Lower limb paralysis                                   2        (unable to hold up >15 seconds)    [  ] Current Cancer                                            2         (within 6 months)  [  ] Immobilization > 24 hrs                              1  [  ] ICU/CCU stay > 24 hours                            1  [  ] Age > 60                                                    1  IMPROVE VTE Score _______0__

## 2018-01-20 LAB
ANION GAP SERPL CALC-SCNC: 9 MMOL/L — SIGNIFICANT CHANGE UP (ref 5–17)
BUN SERPL-MCNC: 15 MG/DL — SIGNIFICANT CHANGE UP (ref 7–23)
C PEPTIDE SERPL-MCNC: 0.3 NG/ML — LOW (ref 0.9–7.1)
CALCIUM SERPL-MCNC: 7.9 MG/DL — LOW (ref 8.5–10.1)
CHLORIDE SERPL-SCNC: 108 MMOL/L — SIGNIFICANT CHANGE UP (ref 96–108)
CO2 SERPL-SCNC: 28 MMOL/L — SIGNIFICANT CHANGE UP (ref 22–31)
CREAT SERPL-MCNC: 0.95 MG/DL — SIGNIFICANT CHANGE UP (ref 0.5–1.3)
GLUCOSE BLDC GLUCOMTR-MCNC: 223 MG/DL — HIGH (ref 70–99)
GLUCOSE BLDC GLUCOMTR-MCNC: 318 MG/DL — HIGH (ref 70–99)
GLUCOSE BLDC GLUCOMTR-MCNC: 397 MG/DL — HIGH (ref 70–99)
GLUCOSE BLDC GLUCOMTR-MCNC: 425 MG/DL — HIGH (ref 70–99)
GLUCOSE BLDC GLUCOMTR-MCNC: 462 MG/DL — CRITICAL HIGH (ref 70–99)
GLUCOSE SERPL-MCNC: 242 MG/DL — HIGH (ref 70–99)
HCT VFR BLD CALC: 39.4 % — SIGNIFICANT CHANGE UP (ref 39–50)
HGB BLD-MCNC: 12.9 G/DL — LOW (ref 13–17)
MAGNESIUM SERPL-MCNC: 1.7 MG/DL — SIGNIFICANT CHANGE UP (ref 1.6–2.6)
MCHC RBC-ENTMCNC: 23.2 PG — LOW (ref 27–34)
MCHC RBC-ENTMCNC: 32.7 GM/DL — SIGNIFICANT CHANGE UP (ref 32–36)
MCV RBC AUTO: 70.8 FL — LOW (ref 80–100)
PHOSPHATE SERPL-MCNC: 2.7 MG/DL — SIGNIFICANT CHANGE UP (ref 2.5–4.5)
PLATELET # BLD AUTO: 302 K/UL — SIGNIFICANT CHANGE UP (ref 150–400)
POTASSIUM SERPL-MCNC: 3.5 MMOL/L — SIGNIFICANT CHANGE UP (ref 3.5–5.3)
POTASSIUM SERPL-SCNC: 3.5 MMOL/L — SIGNIFICANT CHANGE UP (ref 3.5–5.3)
RBC # BLD: 5.56 M/UL — SIGNIFICANT CHANGE UP (ref 4.2–5.8)
RBC # FLD: 12.6 % — SIGNIFICANT CHANGE UP (ref 11–15)
SODIUM SERPL-SCNC: 145 MMOL/L — SIGNIFICANT CHANGE UP (ref 135–145)
WBC # BLD: 11.4 K/UL — HIGH (ref 3.8–10.5)
WBC # FLD AUTO: 11.4 K/UL — HIGH (ref 3.8–10.5)

## 2018-01-20 PROCEDURE — 99232 SBSQ HOSP IP/OBS MODERATE 35: CPT

## 2018-01-20 RX ORDER — SENNA PLUS 8.6 MG/1
2 TABLET ORAL AT BEDTIME
Qty: 0 | Refills: 0 | Status: DISCONTINUED | OUTPATIENT
Start: 2018-01-20 | End: 2018-01-25

## 2018-01-20 RX ADMIN — INSULIN HUMAN 7 UNIT(S): 100 INJECTION, SOLUTION SUBCUTANEOUS at 10:58

## 2018-01-20 RX ADMIN — SENNA PLUS 2 TABLET(S): 8.6 TABLET ORAL at 21:36

## 2018-01-20 RX ADMIN — Medication 500000 UNIT(S): at 21:36

## 2018-01-20 RX ADMIN — Medication 10: at 17:16

## 2018-01-20 RX ADMIN — Medication 8: at 10:57

## 2018-01-20 RX ADMIN — Medication 4: at 08:02

## 2018-01-20 RX ADMIN — INSULIN HUMAN 7 UNIT(S): 100 INJECTION, SOLUTION SUBCUTANEOUS at 08:51

## 2018-01-20 RX ADMIN — AMLODIPINE BESYLATE 2.5 MILLIGRAM(S): 2.5 TABLET ORAL at 06:05

## 2018-01-20 RX ADMIN — Medication 12: at 21:37

## 2018-01-20 RX ADMIN — SODIUM CHLORIDE 125 MILLILITER(S): 9 INJECTION, SOLUTION INTRAVENOUS at 10:14

## 2018-01-20 RX ADMIN — INSULIN HUMAN 7 UNIT(S): 100 INJECTION, SOLUTION SUBCUTANEOUS at 17:16

## 2018-01-20 RX ADMIN — INSULIN GLARGINE 20 UNIT(S): 100 INJECTION, SOLUTION SUBCUTANEOUS at 21:36

## 2018-01-20 NOTE — PROGRESS NOTE ADULT - SUBJECTIVE AND OBJECTIVE BOX
Patient is a 57y old  Male who presents with a chief complaint of sore throat and general weakness (18 Jan 2018 19:31)        HPI:  56 y/o male with a recent diagnosis of HTN presents to ER c/o sore throat, flu-like symptoms and generalized weakness x 2 weeks. He saw his PMD about 1 week ago who prescribed him Ibuprofen and cough medicine OTC. He reports feeling worse than before with loss of appetite and greenish sputum. CXR clear. He states he was recently started on Amlodipine 2.5mg but does not really take it everyday. He denies any chest pain, SOB, palpitations or dizziness. No fever, chills, dysuria, abdominal pain, or n/v/d. +Sick contact at home (son).     In ER, he was found with blood sugar 512, +moderate acetone, +ketones in urine, and Cr 1.64 (18 Jan 2018 19:31)      1/20 SUBJECTIVE & OBJECTIVE: Pt seen and examined at bedside.  No acute events overnight.  Requesting to be left alone.     PHYSICAL EXAM:  T(C): 36.8 (01-20-18 @ 11:37), Max: 37.3 (01-20-18 @ 00:02)  HR: 78 (01-20-18 @ 11:37) (70 - 78)  BP: 157/91 (01-20-18 @ 11:37) (151/86 - 159/91)  RR: 17 (01-20-18 @ 11:37) (17 - 18)  SpO2: 95% (01-20-18 @ 11:37) (94% - 100%)    GENERAL: NAD, well-groomed, well-developed  HEAD:  Atraumatic, Normocephalic  EYES: EOMI, PERRLA, conjunctiva and sclera clear  ENMT: Moist mucous membranes  NECK: Supple, No JVD  NERVOUS SYSTEM:  Alert & Oriented X3, Motor Strength 5/5 B/L upper and lower extremities; DTRs 2+ intact and symmetric  CHEST/LUNG: Clear to auscultation bilaterally; No rales, rhonchi, wheezing, or rubs  HEART: Regular rate and rhythm; No murmurs, rubs, or gallops  ABDOMEN: Soft, Nontender, Nondistended; Bowel sounds present  EXTREMITIES:  2+ Peripheral Pulses, No clubbing, cyanosis, or edema        MEDICATIONS  (STANDING):  amLODIPine   Tablet 2.5 milliGRAM(s) Oral daily  dextrose 5%. 1000 milliLiter(s) (50 mL/Hr) IV Continuous <Continuous>  dextrose 50% Injectable 12.5 Gram(s) IV Push once  dextrose 50% Injectable 25 Gram(s) IV Push once  dextrose 50% Injectable 25 Gram(s) IV Push once  insulin glargine Injectable (LANTUS) 20 Unit(s) SubCutaneous at bedtime  insulin lispro (HumaLOG) corrective regimen sliding scale   SubCutaneous Before meals and at bedtime  insulin regular  human recombinant 7 Unit(s) SubCutaneous before breakfast  insulin regular  human recombinant 7 Unit(s) SubCutaneous before lunch  insulin regular  human recombinant 7 Unit(s) SubCutaneous before dinner  levoFLOXacin IVPB      levoFLOXacin IVPB 500 milliGRAM(s) IV Intermittent every 24 hours  nystatin    Suspension 893234 Unit(s) Oral two times a day  pneumococcal  23 Vaccine (PNEUMOVAX) 0.5 milliLiter(s) IntraMuscular once    MEDICATIONS  (PRN):  acetaminophen   Tablet 650 milliGRAM(s) Oral every 6 hours PRN For Temp greater than 38 C (100.4 F)  acetaminophen   Tablet. 650 milliGRAM(s) Oral every 6 hours PRN Mild Pain (1 - 3)  dextrose Gel 1 Dose(s) Oral once PRN Blood Glucose LESS THAN 70 milliGRAM(s)/deciliter  glucagon  Injectable 1 milliGRAM(s) IntraMuscular once PRN Glucose LESS THAN 70 milligrams/deciliter      LABS:                        12.9   11.4  )-----------( 302      ( 20 Jan 2018 07:50 )             39.4     01-    145  |  108  |  15  ----------------------------<  242<H>  3.5   |  28  |  0.95    Calcium    7.9<L>      20 Jan 2018 07:50  Phosphorous  2.7     01-20  Magneisum = 1.7     01-20    Magnesium, Serum: 1.7 mg/dL (01-20 @ 07:50)    POCT Blood Glucose.: 318 mg/dL (20 Jan 2018 10:57)  POCT Blood Glucose.: 223 mg/dL (20 Jan 2018 08:01)  POCT Blood Glucose.: 457 mg/dL (19 Jan 2018 21:07)  POCT Blood Glucose.: 445 mg/dL (19 Jan 2018 21:06)  POCT Blood Glucose.: 533 mg/dL (19 Jan 2018 16:55)  POCT Blood Glucose.: 544 mg/dL (19 Jan 2018 16:53)    POCT Blood Glucose.: 318 mg/dL (20 Jan 2018 10:57)  POCT Blood Glucose.: 223 mg/dL (20 Jan 2018 08:01)  POCT Blood Glucose.: 457 mg/dL (19 Jan 2018 21:07)  POCT Blood Glucose.: 445 mg/dL (19 Jan 2018 21:06)  POCT Blood Glucose.: 533 mg/dL (19 Jan 2018 16:55)  POCT Blood Glucose.: 544 mg/dL (19 Jan 2018 16:53)      POCT Blood Glucose.: 318 mg/dL (20 Jan 2018 10:57)      CARDIAC MARKERS ( 19 Jan 2018 08:35 )  .034 ng/mL / x     / x     / x     / x      CARDIAC MARKERS ( 19 Jan 2018 01:16 )  .030 ng/mL / x     / x     / x     / x        DVT/GI pophylaxsis  Discussed with pt @ bedside

## 2018-01-20 NOTE — PROGRESS NOTE ADULT - PROBLEM SELECTOR PLAN 3
- likely from dehydration   - s/p about 2L IVF in ER   - c/w 1/2NS @ 125cc/hr
- likely from dehydration   - s/p about 2L IVF in ER   - now resolved  - can D/C IV fluids

## 2018-01-20 NOTE — PROGRESS NOTE ADULT - ASSESSMENT
58 y/o male with a recent diagnosis of HTN presents with sore throat and generalized weakness x 2 weeks likely secondary to bacterial pharyngitis.

## 2018-01-20 NOTE — PROGRESS NOTE ADULT - PROBLEM SELECTOR PLAN 4
- continue with Amlodipine 2.5mg   - low sodium diet
- continue with Amlodipine 2.5mg   - low sodium diet

## 2018-01-20 NOTE — PROGRESS NOTE ADULT - PROBLEM SELECTOR PLAN 2
- new onset diabetes mellitus   - check premeal finger sticks and insulin sliding scale coverage  - Hgb A1C = 15.5 n 1/19  - Appreciate endo eval :  continue with current insulin regimen  add prandial lispro 7 units   may need Metformin added as patient may have increased Insulin Resistance   Check C-Peptide before doing that   glucose toxicity increased - new onset diabetes mellitus   - check premeal finger sticks and insulin sliding scale coverage  - Hgb A1C = 15.5 n 1/19  - Appreciate endo eval :  continue with current insulin regimen ISS + Lantus 20 units Qhs  add prandial lispro 7 units   may need Metformin added as patient may have increased Insulin Resistance   Check C-Peptide before doing that   glucose toxicity increased

## 2018-01-20 NOTE — PROGRESS NOTE ADULT - SUBJECTIVE AND OBJECTIVE BOX
Patient is a 57y old  Male who presents with a chief complaint of sore throat and general weakness (18 Jan 2018 19:31)      Interval History: finger sticks are slightly decreased   prandial lispro 7 units added yesterday   patient with increased Insulin Resistance and infection       MEDICATIONS  (STANDING):  amLODIPine   Tablet 2.5 milliGRAM(s) Oral daily  dextrose 5%. 1000 milliLiter(s) (50 mL/Hr) IV Continuous <Continuous>  dextrose 50% Injectable 12.5 Gram(s) IV Push once  dextrose 50% Injectable 25 Gram(s) IV Push once  dextrose 50% Injectable 25 Gram(s) IV Push once  insulin glargine Injectable (LANTUS) 20 Unit(s) SubCutaneous at bedtime  insulin lispro (HumaLOG) corrective regimen sliding scale   SubCutaneous Before meals and at bedtime  insulin regular  human recombinant 7 Unit(s) SubCutaneous before breakfast  insulin regular  human recombinant 7 Unit(s) SubCutaneous before lunch  insulin regular  human recombinant 7 Unit(s) SubCutaneous before dinner  levoFLOXacin IVPB      levoFLOXacin IVPB 500 milliGRAM(s) IV Intermittent every 24 hours  nystatin    Suspension 525270 Unit(s) Oral two times a day  pneumococcal  23 Vaccine (PNEUMOVAX) 0.5 milliLiter(s) IntraMuscular once    MEDICATIONS  (PRN):  acetaminophen   Tablet 650 milliGRAM(s) Oral every 6 hours PRN For Temp greater than 38 C (100.4 F)  acetaminophen   Tablet. 650 milliGRAM(s) Oral every 6 hours PRN Mild Pain (1 - 3)  dextrose Gel 1 Dose(s) Oral once PRN Blood Glucose LESS THAN 70 milliGRAM(s)/deciliter  glucagon  Injectable 1 milliGRAM(s) IntraMuscular once PRN Glucose LESS THAN 70 milligrams/deciliter      Allergies    No Known Allergies    Intolerances        REVIEW OF SYSTEMS:  CONSTITUTIONAL:   EYES: No eye pain, visual disturbances, or discharge  ENMT:  No difficulty hearing, tinnitus, vertigo; No sinus or throat pain  NECK: No pain or stiffness  RESPIRATORY: No cough, wheezing, chills or hemoptysis; No shortness of breath  CARDIOVASCULAR: No chest pain, palpitations, dizziness, or leg swelling  GASTROINTESTINAL: No abdominal or epigastric pain. No nausea, vomiting, or hematemesis; No diarrhea or constipation. No melena or hematochezia.  GENITOURINARY: No dysuria, frequency, hematuria, or incontinence  NEUROLOGICAL: No headaches, memory loss, loss of strength, numbness, or tremors  SKIN: No itching, burning, rashes, or lesions   LYMPH NODES: No enlarged glands  ENDOCRINE: No heat or cold intolerance; No hair loss  MUSCULOSKELETAL: No joint pain or swelling; No muscle, back, or extremity pain  PSYCHIATRIC: No depression, anxiety, mood swings, or difficulty sleeping  HEME/LYMPH: No easy bruising, or bleeding gums  ALLERY AND IMMUNOLOGIC: No hives or eczema    Vital Signs Last 24 Hrs  T(C): 37.6 (20 Jan 2018 17:21), Max: 37.6 (20 Jan 2018 17:21)  T(F): 99.6 (20 Jan 2018 17:21), Max: 99.6 (20 Jan 2018 17:21)  HR: 78 (20 Jan 2018 17:21) (70 - 78)  BP: 150/86 (20 Jan 2018 17:21) (150/86 - 159/91)  BP(mean): --  RR: 18 (20 Jan 2018 17:21) (17 - 18)  SpO2: 92% (20 Jan 2018 17:21) (92% - 100%)    PHYSICAL EXAM:  GENERAL: deferred     LABS:        CAPILLARY BLOOD GLUCOSE      POCT Blood Glucose.: 397 mg/dL (20 Jan 2018 16:03)  POCT Blood Glucose.: 318 mg/dL (20 Jan 2018 10:57)  POCT Blood Glucose.: 223 mg/dL (20 Jan 2018 08:01)  POCT Blood Glucose.: 457 mg/dL (19 Jan 2018 21:07)  POCT Blood Glucose.: 445 mg/dL (19 Jan 2018 21:06)    Lipid panel:   CARDIAC MARKERS ( 19 Jan 2018 08:35 )  .034 ng/mL / x     / x     / x     / x      CARDIAC MARKERS ( 19 Jan 2018 01:16 )  .030 ng/mL / x     / x     / x     / x              Thyroid:  Diabetes Tests:  Parathyroid Panel:  Adrenals:  RADIOLOGY & ADDITIONAL TESTS:    Imaging Personally Reviewed:  [ ] YES  [ ] NO    Consultant(s) Notes Reviewed:  [ ] YES  [ ] NO    Care Discussed with Consultants/Other Providers [ ] YES  [ ] NO

## 2018-01-21 LAB
ANION GAP SERPL CALC-SCNC: 11 MMOL/L — SIGNIFICANT CHANGE UP (ref 5–17)
ANION GAP SERPL CALC-SCNC: 9 MMOL/L — SIGNIFICANT CHANGE UP (ref 5–17)
BUN SERPL-MCNC: 14 MG/DL — SIGNIFICANT CHANGE UP (ref 7–23)
BUN SERPL-MCNC: 18 MG/DL — SIGNIFICANT CHANGE UP (ref 7–23)
CALCIUM SERPL-MCNC: 8 MG/DL — LOW (ref 8.5–10.1)
CALCIUM SERPL-MCNC: 8.3 MG/DL — LOW (ref 8.5–10.1)
CHLORIDE SERPL-SCNC: 102 MMOL/L — SIGNIFICANT CHANGE UP (ref 96–108)
CHLORIDE SERPL-SCNC: 103 MMOL/L — SIGNIFICANT CHANGE UP (ref 96–108)
CO2 SERPL-SCNC: 27 MMOL/L — SIGNIFICANT CHANGE UP (ref 22–31)
CO2 SERPL-SCNC: 28 MMOL/L — SIGNIFICANT CHANGE UP (ref 22–31)
CREAT SERPL-MCNC: 0.99 MG/DL — SIGNIFICANT CHANGE UP (ref 0.5–1.3)
CREAT SERPL-MCNC: 1.02 MG/DL — SIGNIFICANT CHANGE UP (ref 0.5–1.3)
GLUCOSE BLDC GLUCOMTR-MCNC: 179 MG/DL — HIGH (ref 70–99)
GLUCOSE BLDC GLUCOMTR-MCNC: 292 MG/DL — HIGH (ref 70–99)
GLUCOSE BLDC GLUCOMTR-MCNC: 304 MG/DL — HIGH (ref 70–99)
GLUCOSE BLDC GLUCOMTR-MCNC: 344 MG/DL — HIGH (ref 70–99)
GLUCOSE SERPL-MCNC: 169 MG/DL — HIGH (ref 70–99)
GLUCOSE SERPL-MCNC: 363 MG/DL — HIGH (ref 70–99)
HCT VFR BLD CALC: 43.4 % — SIGNIFICANT CHANGE UP (ref 39–50)
HGB BLD-MCNC: 13.9 G/DL — SIGNIFICANT CHANGE UP (ref 13–17)
MAGNESIUM SERPL-MCNC: 2 MG/DL — SIGNIFICANT CHANGE UP (ref 1.6–2.6)
MCHC RBC-ENTMCNC: 22.4 PG — LOW (ref 27–34)
MCHC RBC-ENTMCNC: 32 GM/DL — SIGNIFICANT CHANGE UP (ref 32–36)
MCV RBC AUTO: 70 FL — LOW (ref 80–100)
PHOSPHATE SERPL-MCNC: 3 MG/DL — SIGNIFICANT CHANGE UP (ref 2.5–4.5)
PLATELET # BLD AUTO: 301 K/UL — SIGNIFICANT CHANGE UP (ref 150–400)
POTASSIUM SERPL-MCNC: 2.8 MMOL/L — CRITICAL LOW (ref 3.5–5.3)
POTASSIUM SERPL-MCNC: 3.4 MMOL/L — LOW (ref 3.5–5.3)
POTASSIUM SERPL-SCNC: 2.8 MMOL/L — CRITICAL LOW (ref 3.5–5.3)
POTASSIUM SERPL-SCNC: 3.4 MMOL/L — LOW (ref 3.5–5.3)
RBC # BLD: 6.2 M/UL — HIGH (ref 4.2–5.8)
RBC # FLD: 12.3 % — SIGNIFICANT CHANGE UP (ref 11–15)
SODIUM SERPL-SCNC: 139 MMOL/L — SIGNIFICANT CHANGE UP (ref 135–145)
SODIUM SERPL-SCNC: 141 MMOL/L — SIGNIFICANT CHANGE UP (ref 135–145)
WBC # BLD: 11.9 K/UL — HIGH (ref 3.8–10.5)
WBC # FLD AUTO: 11.9 K/UL — HIGH (ref 3.8–10.5)

## 2018-01-21 RX ORDER — POTASSIUM CHLORIDE 20 MEQ
10 PACKET (EA) ORAL ONCE
Qty: 0 | Refills: 0 | Status: COMPLETED | OUTPATIENT
Start: 2018-01-21 | End: 2018-01-21

## 2018-01-21 RX ORDER — POTASSIUM CHLORIDE 20 MEQ
40 PACKET (EA) ORAL ONCE
Qty: 0 | Refills: 0 | Status: COMPLETED | OUTPATIENT
Start: 2018-01-21 | End: 2018-01-21

## 2018-01-21 RX ORDER — POLYETHYLENE GLYCOL 3350 17 G/17G
17 POWDER, FOR SOLUTION ORAL DAILY
Qty: 0 | Refills: 0 | Status: DISCONTINUED | OUTPATIENT
Start: 2018-01-21 | End: 2018-01-25

## 2018-01-21 RX ORDER — POTASSIUM CHLORIDE 20 MEQ
10 PACKET (EA) ORAL
Qty: 0 | Refills: 0 | Status: COMPLETED | OUTPATIENT
Start: 2018-01-21 | End: 2018-01-21

## 2018-01-21 RX ORDER — DOCUSATE SODIUM 100 MG
100 CAPSULE ORAL THREE TIMES A DAY
Qty: 0 | Refills: 0 | Status: DISCONTINUED | OUTPATIENT
Start: 2018-01-21 | End: 2018-01-25

## 2018-01-21 RX ADMIN — Medication 500000 UNIT(S): at 22:47

## 2018-01-21 RX ADMIN — Medication 500000 UNIT(S): at 09:29

## 2018-01-21 RX ADMIN — Medication 100 MILLIGRAM(S): at 22:24

## 2018-01-21 RX ADMIN — INSULIN HUMAN 7 UNIT(S): 100 INJECTION, SOLUTION SUBCUTANEOUS at 07:49

## 2018-01-21 RX ADMIN — Medication 100 MILLIEQUIVALENT(S): at 10:45

## 2018-01-21 RX ADMIN — INSULIN HUMAN 7 UNIT(S): 100 INJECTION, SOLUTION SUBCUTANEOUS at 16:51

## 2018-01-21 RX ADMIN — INSULIN HUMAN 7 UNIT(S): 100 INJECTION, SOLUTION SUBCUTANEOUS at 10:57

## 2018-01-21 RX ADMIN — Medication 6: at 10:57

## 2018-01-21 RX ADMIN — INSULIN GLARGINE 20 UNIT(S): 100 INJECTION, SOLUTION SUBCUTANEOUS at 22:20

## 2018-01-21 RX ADMIN — Medication 8: at 16:51

## 2018-01-21 RX ADMIN — Medication 100 MILLIEQUIVALENT(S): at 12:10

## 2018-01-21 RX ADMIN — Medication 40 MILLIEQUIVALENT(S): at 09:29

## 2018-01-21 RX ADMIN — Medication 40 MILLIEQUIVALENT(S): at 10:44

## 2018-01-21 RX ADMIN — Medication 100 MILLIEQUIVALENT(S): at 13:00

## 2018-01-21 RX ADMIN — AMLODIPINE BESYLATE 2.5 MILLIGRAM(S): 2.5 TABLET ORAL at 05:42

## 2018-01-21 RX ADMIN — Medication 8: at 22:21

## 2018-01-21 RX ADMIN — Medication 2: at 07:49

## 2018-01-21 NOTE — DIETITIAN INITIAL EVALUATION ADULT. - PROBLEM SELECTOR PLAN 1
- Admit to Medicine  - check BCx and UCx  - Rapid flu negative  - check RVP  - Start IV Levaquin 500mg

## 2018-01-21 NOTE — DIETITIAN INITIAL EVALUATION ADULT. - ENERGY NEEDS
Height (cm): 167.64 (01-18)  Weight (kg): 83.2 (01-19)  BMI (kg/m2): 29.6 (01-19)  Ideal Body Weight: 64.5 kg +/-10%  % Ideal Body Weight: 142%

## 2018-01-21 NOTE — DIETITIAN INITIAL EVALUATION ADULT. - PERTINENT MEDS FT
levaquin, nystatin, d5@50mL/hr, colace, miralax, Klor-con, KCl, Senna, lantus, humalog, humulin, tylenol, pneumovax, norvasc, glucagon prn

## 2018-01-21 NOTE — DIETITIAN INITIAL EVALUATION ADULT. - OTHER INFO
pt seen due to consult for diabetes oral thrush. pt newly dx diabetic. presented to ER c BS of 512, + ketones, + moderate acetone. pt c loss of appetite for a few weeks PTA due to sore throat, generalized weakness, flu-like symptoms. appetite and po intake good now, denies N/V. pt had some [place] citizen food on his tray, brought from home. HgA1c >15.5% pt provided c nutrition education.

## 2018-01-21 NOTE — DIETITIAN INITIAL EVALUATION ADULT. - PERTINENT LABORATORY DATA
01-21 Na141 mmol/L Glu 169 mg/dL<H> K+ 2.8 mmol/L<LL> Cr  0.99 mg/dL BUN 14 mg/dL Phos 3.0 mg/dL Alb n/a   PAB n/a

## 2018-01-21 NOTE — DIETITIAN INITIAL EVALUATION ADULT. - NS AS NUTRI INTERV ED CONTENT
Nutrition relationship to health/disease/You and Diabetes; A patient manual, Meal Planning c the plate method/Recommended modifications

## 2018-01-22 LAB
ANION GAP SERPL CALC-SCNC: 9 MMOL/L — SIGNIFICANT CHANGE UP (ref 5–17)
BASOPHILS # BLD AUTO: 0 K/UL — SIGNIFICANT CHANGE UP (ref 0–0.2)
BASOPHILS NFR BLD AUTO: 0.4 % — SIGNIFICANT CHANGE UP (ref 0–2)
BUN SERPL-MCNC: 16 MG/DL — SIGNIFICANT CHANGE UP (ref 7–23)
CALCIUM SERPL-MCNC: 8 MG/DL — LOW (ref 8.5–10.1)
CHLORIDE SERPL-SCNC: 100 MMOL/L — SIGNIFICANT CHANGE UP (ref 96–108)
CO2 SERPL-SCNC: 28 MMOL/L — SIGNIFICANT CHANGE UP (ref 22–31)
CREAT SERPL-MCNC: 0.94 MG/DL — SIGNIFICANT CHANGE UP (ref 0.5–1.3)
CRP SERPL-MCNC: 8.4 MG/DL — HIGH (ref 0–0.4)
EOSINOPHIL # BLD AUTO: 0 K/UL — SIGNIFICANT CHANGE UP (ref 0–0.5)
EOSINOPHIL NFR BLD AUTO: 0.3 % — SIGNIFICANT CHANGE UP (ref 0–6)
GLUCOSE BLDC GLUCOMTR-MCNC: 204 MG/DL — HIGH (ref 70–99)
GLUCOSE BLDC GLUCOMTR-MCNC: 262 MG/DL — HIGH (ref 70–99)
GLUCOSE BLDC GLUCOMTR-MCNC: 285 MG/DL — HIGH (ref 70–99)
GLUCOSE BLDC GLUCOMTR-MCNC: 463 MG/DL — CRITICAL HIGH (ref 70–99)
GLUCOSE BLDC GLUCOMTR-MCNC: 469 MG/DL — CRITICAL HIGH (ref 70–99)
GLUCOSE SERPL-MCNC: 276 MG/DL — HIGH (ref 70–99)
HCT VFR BLD CALC: 37.9 % — LOW (ref 39–50)
HGB BLD-MCNC: 12.3 G/DL — LOW (ref 13–17)
LYMPHOCYTES # BLD AUTO: 1.6 K/UL — SIGNIFICANT CHANGE UP (ref 1–3.3)
LYMPHOCYTES # BLD AUTO: 16.2 % — SIGNIFICANT CHANGE UP (ref 13–44)
MAGNESIUM SERPL-MCNC: 2 MG/DL — SIGNIFICANT CHANGE UP (ref 1.6–2.6)
MCHC RBC-ENTMCNC: 22.8 PG — LOW (ref 27–34)
MCHC RBC-ENTMCNC: 32.4 GM/DL — SIGNIFICANT CHANGE UP (ref 32–36)
MCV RBC AUTO: 70.2 FL — LOW (ref 80–100)
MONOCYTES # BLD AUTO: 0.8 K/UL — SIGNIFICANT CHANGE UP (ref 0–0.9)
MONOCYTES NFR BLD AUTO: 8 % — SIGNIFICANT CHANGE UP (ref 2–14)
NEUTROPHILS # BLD AUTO: 7.2 K/UL — SIGNIFICANT CHANGE UP (ref 1.8–7.4)
NEUTROPHILS NFR BLD AUTO: 75.1 % — SIGNIFICANT CHANGE UP (ref 43–77)
PHOSPHATE SERPL-MCNC: 2.2 MG/DL — LOW (ref 2.5–4.5)
PLATELET # BLD AUTO: 260 K/UL — SIGNIFICANT CHANGE UP (ref 150–400)
POTASSIUM SERPL-MCNC: 3.5 MMOL/L — SIGNIFICANT CHANGE UP (ref 3.5–5.3)
POTASSIUM SERPL-SCNC: 3.5 MMOL/L — SIGNIFICANT CHANGE UP (ref 3.5–5.3)
RBC # BLD: 5.4 M/UL — SIGNIFICANT CHANGE UP (ref 4.2–5.8)
RBC # FLD: 12.3 % — SIGNIFICANT CHANGE UP (ref 11–15)
SODIUM SERPL-SCNC: 137 MMOL/L — SIGNIFICANT CHANGE UP (ref 135–145)
WBC # BLD: 9.6 K/UL — SIGNIFICANT CHANGE UP (ref 3.8–10.5)
WBC # FLD AUTO: 9.6 K/UL — SIGNIFICANT CHANGE UP (ref 3.8–10.5)

## 2018-01-22 PROCEDURE — 99233 SBSQ HOSP IP/OBS HIGH 50: CPT

## 2018-01-22 RX ORDER — ENOXAPARIN SODIUM 100 MG/ML
40 INJECTION SUBCUTANEOUS DAILY
Qty: 0 | Refills: 0 | Status: DISCONTINUED | OUTPATIENT
Start: 2018-01-22 | End: 2018-01-23

## 2018-01-22 RX ORDER — SODIUM,POTASSIUM PHOSPHATES 278-250MG
1 POWDER IN PACKET (EA) ORAL
Qty: 0 | Refills: 0 | Status: DISCONTINUED | OUTPATIENT
Start: 2018-01-22 | End: 2018-01-25

## 2018-01-22 RX ADMIN — Medication 12: at 23:08

## 2018-01-22 RX ADMIN — Medication 1 TABLET(S): at 23:06

## 2018-01-22 RX ADMIN — INSULIN HUMAN 7 UNIT(S): 100 INJECTION, SOLUTION SUBCUTANEOUS at 16:05

## 2018-01-22 RX ADMIN — ENOXAPARIN SODIUM 40 MILLIGRAM(S): 100 INJECTION SUBCUTANEOUS at 23:08

## 2018-01-22 RX ADMIN — Medication 100 MILLIGRAM(S): at 13:02

## 2018-01-22 RX ADMIN — Medication 6: at 16:04

## 2018-01-22 RX ADMIN — INSULIN HUMAN 7 UNIT(S): 100 INJECTION, SOLUTION SUBCUTANEOUS at 08:06

## 2018-01-22 RX ADMIN — INSULIN GLARGINE 20 UNIT(S): 100 INJECTION, SOLUTION SUBCUTANEOUS at 23:07

## 2018-01-22 RX ADMIN — AMLODIPINE BESYLATE 2.5 MILLIGRAM(S): 2.5 TABLET ORAL at 05:56

## 2018-01-22 RX ADMIN — POLYETHYLENE GLYCOL 3350 17 GRAM(S): 17 POWDER, FOR SOLUTION ORAL at 11:25

## 2018-01-22 RX ADMIN — Medication 100 MILLIGRAM(S): at 05:56

## 2018-01-22 RX ADMIN — Medication 500000 UNIT(S): at 10:58

## 2018-01-22 RX ADMIN — Medication 6: at 11:25

## 2018-01-22 RX ADMIN — Medication 4: at 08:06

## 2018-01-22 RX ADMIN — INSULIN HUMAN 7 UNIT(S): 100 INJECTION, SOLUTION SUBCUTANEOUS at 11:25

## 2018-01-22 RX ADMIN — Medication 500000 UNIT(S): at 23:06

## 2018-01-22 RX ADMIN — Medication 100 MILLIGRAM(S): at 23:06

## 2018-01-22 NOTE — PROGRESS NOTE ADULT - SUBJECTIVE AND OBJECTIVE BOX
Patient is a 57y old Male w/ history of HTN & DM who presents with a chief complaint of sore throat and general weakness  and was found to have oral thrush and suspected pharyngitis.      INTERVAL HPI/OVERNIGHT EVENTS:    MEDICATIONS  (STANDING):  amLODIPine   Tablet 2.5 milliGRAM(s) Oral daily  dextrose 5%. 1000 milliLiter(s) (50 mL/Hr) IV Continuous <Continuous>  dextrose 50% Injectable 12.5 Gram(s) IV Push once  dextrose 50% Injectable 25 Gram(s) IV Push once  dextrose 50% Injectable 25 Gram(s) IV Push once  docusate sodium 100 milliGRAM(s) Oral three times a day  insulin glargine Injectable (LANTUS) 20 Unit(s) SubCutaneous at bedtime  insulin lispro (HumaLOG) corrective regimen sliding scale   SubCutaneous Before meals and at bedtime  insulin regular  human recombinant 7 Unit(s) SubCutaneous before breakfast  insulin regular  human recombinant 7 Unit(s) SubCutaneous before lunch  insulin regular  human recombinant 7 Unit(s) SubCutaneous before dinner  levoFLOXacin IVPB      levoFLOXacin IVPB 500 milliGRAM(s) IV Intermittent every 24 hours  nystatin    Suspension 920148 Unit(s) Oral two times a day  pneumococcal  23 Vaccine (PNEUMOVAX) 0.5 milliLiter(s) IntraMuscular once  polyethylene glycol 3350 17 Gram(s) Oral daily    MEDICATIONS  (PRN):  acetaminophen   Tablet 650 milliGRAM(s) Oral every 6 hours PRN For Temp greater than 38 C (100.4 F)  acetaminophen   Tablet. 650 milliGRAM(s) Oral every 6 hours PRN Mild Pain (1 - 3)  dextrose Gel 1 Dose(s) Oral once PRN Blood Glucose LESS THAN 70 milliGRAM(s)/deciliter  glucagon  Injectable 1 milliGRAM(s) IntraMuscular once PRN Glucose LESS THAN 70 milligrams/deciliter  senna 2 Tablet(s) Oral at bedtime PRN Constipation      Allergies    No Known Allergies    Intolerances        Vital Signs Last 24 Hrs  T(C): 37.4 (22 Jan 2018 17:28), Max: 37.4 (22 Jan 2018 17:28)  T(F): 99.4 (22 Jan 2018 17:28), Max: 99.4 (22 Jan 2018 17:28)  HR: 86 (22 Jan 2018 17:28) (70 - 86)  BP: 159/84 (22 Jan 2018 17:28) (128/75 - 159/84)  BP(mean): --  RR: 16 (22 Jan 2018 17:28) (16 - 17)  SpO2: 93% (22 Jan 2018 17:28) (93% - 98%)    PHYSICAL EXAM:  GENERAL: NAD, well-groomed, well-developed  HEAD:  Atraumatic, Normocephalic  EYES: EOMI, PERRLA, conjunctiva and sclera clear  ENMT: No tonsillar erythema, exudates, or enlargement; Moist mucous membranes, Good dentition, No lesions  NECK: Supple, No JVD, Normal thyroid  NERVOUS SYSTEM:  Alert & Oriented X3, Good concentration; Motor Strength 5/5 B/L upper and lower extremities; DTRs 2+ intact and symmetric  CHEST/LUNG: Clear to auscultation bilaterally; No rales, rhonchi, wheezing, or rubs  HEART: Regular rate and rhythm; No murmurs, rubs, or gallops  ABDOMEN: Soft, Nontender, Nondistended; Bowel sounds present  EXTREMITIES:  2+ Peripheral Pulses, No clubbing, cyanosis, or edema  LYMPH: No lymphadenopathy noted  SKIN: No rashes or lesions    LABS:                        12.3   9.6   )-----------( 260      ( 22 Jan 2018 11:44 )             37.9     01-22    137  |  100  |  16  ----------------------------<  276<H>  3.5   |  28  |  0.94    Ca    8.0<L>      22 Jan 2018 11:44  Phos  2.2     01-22  Mg     2.0     01-22          CAPILLARY BLOOD GLUCOSE      POCT Blood Glucose.: 285 mg/dL (22 Jan 2018 15:49)  POCT Blood Glucose.: 262 mg/dL (22 Jan 2018 11:24)  POCT Blood Glucose.: 204 mg/dL (22 Jan 2018 08:05)  POCT Blood Glucose.: 344 mg/dL (21 Jan 2018 22:17)      Culture - Blood (collected 19 Jan 2018 08:28)  Source: .Blood Blood  Preliminary Report (20 Jan 2018 09:00):    No growth to date.    Culture - Blood (collected 19 Jan 2018 08:28)  Source: .Blood Blood  Preliminary Report (20 Jan 2018 09:00):    No growth to date.    Culture - Urine (collected 18 Jan 2018 14:34)  Source: .Urine Clean Catch (Midstream)  Final Report (19 Jan 2018 12:08):    <10,000 CFU/ml Normal Urogenital pablo present      RADIOLOGY & ADDITIONAL TESTS:    01-21-18 @ 07:01  -  01-22-18 @ 07:00  --------------------------------------------------------  IN:    Solution: 200 mL  Total IN: 200 mL    OUT:  Total OUT: 0 mL    Total NET: 200 mL      01-22-18 @ 07:01  -  01-22-18 @ 18:08  --------------------------------------------------------  IN:    Oral Fluid: 875 mL  Total IN: 875 mL    OUT:  Total OUT: 0 mL    Total NET: 875 mL Patient is a 57y old Male w/ history of HTN & DM who presents with a chief complaint of sore throat and general weakness  and was found to have oral thrush and suspected pharyngitis.      INTERVAL HPI/OVERNIGHT EVENTS:    MEDICATIONS  (STANDING):  amLODIPine   Tablet 2.5 milliGRAM(s) Oral daily  dextrose 5%. 1000 milliLiter(s) (50 mL/Hr) IV Continuous <Continuous>  dextrose 50% Injectable 12.5 Gram(s) IV Push once  dextrose 50% Injectable 25 Gram(s) IV Push once  dextrose 50% Injectable 25 Gram(s) IV Push once  docusate sodium 100 milliGRAM(s) Oral three times a day  insulin glargine Injectable (LANTUS) 20 Unit(s) SubCutaneous at bedtime  insulin lispro (HumaLOG) corrective regimen sliding scale   SubCutaneous Before meals and at bedtime  insulin regular  human recombinant 7 Unit(s) SubCutaneous before breakfast  insulin regular  human recombinant 7 Unit(s) SubCutaneous before lunch  insulin regular  human recombinant 7 Unit(s) SubCutaneous before dinner  levoFLOXacin IVPB      levoFLOXacin IVPB 500 milliGRAM(s) IV Intermittent every 24 hours  nystatin    Suspension 590211 Unit(s) Oral two times a day  pneumococcal  23 Vaccine (PNEUMOVAX) 0.5 milliLiter(s) IntraMuscular once  polyethylene glycol 3350 17 Gram(s) Oral daily    MEDICATIONS  (PRN):  acetaminophen   Tablet 650 milliGRAM(s) Oral every 6 hours PRN For Temp greater than 38 C (100.4 F)  acetaminophen   Tablet. 650 milliGRAM(s) Oral every 6 hours PRN Mild Pain (1 - 3)  dextrose Gel 1 Dose(s) Oral once PRN Blood Glucose LESS THAN 70 milliGRAM(s)/deciliter  glucagon  Injectable 1 milliGRAM(s) IntraMuscular once PRN Glucose LESS THAN 70 milligrams/deciliter  senna 2 Tablet(s) Oral at bedtime PRN Constipation      Allergies    No Known Allergies    Intolerances        Vital Signs Last 24 Hrs  T(C): 37.4 (22 Jan 2018 17:28), Max: 37.4 (22 Jan 2018 17:28)  T(F): 99.4 (22 Jan 2018 17:28), Max: 99.4 (22 Jan 2018 17:28)  HR: 86 (22 Jan 2018 17:28) (70 - 86)  BP: 159/84 (22 Jan 2018 17:28) (128/75 - 159/84)  BP(mean): --  RR: 16 (22 Jan 2018 17:28) (16 - 17)  SpO2: 93% (22 Jan 2018 17:28) (93% - 98%)    PHYSICAL EXAM:  GENERAL: NAD, well-groomed, well-developed  HEAD:  Atraumatic, Normocephalic  EYES: EOMI, PERRLA, conjunctiva and sclera clear  NECK: Supple  NERVOUS SYSTEM:  Alert & Oriented X3  CHEST/LUNG: Clear to auscultation bilaterally; No rales, rhonchi, wheezing, or rubs  HEART: Regular rate and rhythm; No murmurs, rubs, or gallops  ABDOMEN: Soft, Nontender, Nondistended; Bowel sounds present  EXTREMITIES: No edema    LABS:                        12.3   9.6   )-----------( 260      ( 22 Jan 2018 11:44 )             37.9     01-22    137  |  100  |  16  ----------------------------<  276<H>  3.5   |  28  |  0.94    Ca    8.0<L>      22 Jan 2018 11:44  Phos  2.2     01-22  Mg     2.0     01-22          CAPILLARY BLOOD GLUCOSE      POCT Blood Glucose.: 285 mg/dL (22 Jan 2018 15:49)  POCT Blood Glucose.: 262 mg/dL (22 Jan 2018 11:24)  POCT Blood Glucose.: 204 mg/dL (22 Jan 2018 08:05)  POCT Blood Glucose.: 344 mg/dL (21 Jan 2018 22:17)      Culture - Blood (collected 19 Jan 2018 08:28)  Source: .Blood Blood  Preliminary Report (20 Jan 2018 09:00):    No growth to date.    Culture - Blood (collected 19 Jan 2018 08:28)  Source: .Blood Blood  Preliminary Report (20 Jan 2018 09:00):    No growth to date.    Culture - Urine (collected 18 Jan 2018 14:34)  Source: .Urine Clean Catch (Midstream)  Final Report (19 Jan 2018 12:08):    <10,000 CFU/ml Normal Urogenital pablo present      RADIOLOGY & ADDITIONAL TESTS:    01-21-18 @ 07:01  -  01-22-18 @ 07:00  --------------------------------------------------------  IN:    Solution: 200 mL  Total IN: 200 mL    OUT:  Total OUT: 0 mL    Total NET: 200 mL      01-22-18 @ 07:01  -  01-22-18 @ 18:08  --------------------------------------------------------  IN:    Oral Fluid: 875 mL  Total IN: 875 mL    OUT:  Total OUT: 0 mL    Total NET: 875 mL

## 2018-01-22 NOTE — PROGRESS NOTE ADULT - ASSESSMENT
58 y/o male with history of DM & HTN presented with sore throat and generalized weakness x 2 weeks and was found to have oral thrush and suspected  bacterial pharyngitis.     Possible pharyngitis with viral syndrome  - Blood cultures NGTD  - Rapid flu negative / RVP negative   - CXR on 1/18 showed no focal airspace opacities  - c/w  empiric Levaquin 500mg  - leukocytosis has resolved    Thrush  - likely due to uncontrolled DM  - c/w nystatin   - HIV negative    Newly diagnosed Diabetes  - c/w premeal finger sticks and insulin sliding scale coverage  - Hgb A1C = 15.5 on 1/19  - Endo following  - c/w ISS, Lantus & prandial lispro   - Endo may also add Metformin pending C-Peptide results    PHILLIP   - likely prerenal from dehydration   - s/p about 2L IVF in ER   - now resolved    Hypertension  - c/w Amlodipine 2.5mg   - c/w low sodium diet    Hypophosphatemia  - start PO phos    Hypernatremia  - resolved w/ IVF    Prophylaxis:  DVT: Lovenox  GI: PO diet 58 y/o male with history of DM & HTN presented with sore throat and generalized weakness x 2 weeks and was found to have oral thrush and suspected  bacterial pharyngitis.     Possible pharyngitis with viral syndrome  - Blood cultures NGTD  - Rapid flu negative / RVP negative   - CXR on 1/18 showed no focal airspace opacities  - change Levaquin 500mg to PO  - leukocytosis has resolved    Thrush  - likely due to uncontrolled DM  - c/w nystatin   - HIV negative    Newly diagnosed Diabetes  - c/w premeal finger sticks and insulin sliding scale coverage  - Hgb A1C = 15.5 on 1/19  - Endo following  - c/w ISS, Lantus & prandial lispro   - Endo may also add Metformin pending C-Peptide results    PHILLIP   - likely prerenal from dehydration   - s/p about 2L IVF in ER   - now resolved    Hypertension  - c/w Amlodipine 2.5mg   - c/w low sodium diet    Hypophosphatemia  - start PO phos    Hypernatremia  - resolved w/ IVF    Prophylaxis:  DVT: Lovenox  GI: PO diet

## 2018-01-22 NOTE — PROGRESS NOTE ADULT - SUBJECTIVE AND OBJECTIVE BOX
Patient is a 57y old  Male who presents with a chief complaint of sore throat and general weakness (18 Jan 2018 19:31)      Interval History: finger sticks are increased   patient on insulin , with increased Insulin Resistance   sepsis driven hyperglycemia   discharge planning is on     MEDICATIONS  (STANDING):  amLODIPine   Tablet 2.5 milliGRAM(s) Oral daily  dextrose 5%. 1000 milliLiter(s) (50 mL/Hr) IV Continuous <Continuous>  dextrose 50% Injectable 12.5 Gram(s) IV Push once  dextrose 50% Injectable 25 Gram(s) IV Push once  dextrose 50% Injectable 25 Gram(s) IV Push once  docusate sodium 100 milliGRAM(s) Oral three times a day  insulin glargine Injectable (LANTUS) 20 Unit(s) SubCutaneous at bedtime  insulin lispro (HumaLOG) corrective regimen sliding scale   SubCutaneous Before meals and at bedtime  insulin regular  human recombinant 7 Unit(s) SubCutaneous before breakfast  insulin regular  human recombinant 7 Unit(s) SubCutaneous before lunch  insulin regular  human recombinant 7 Unit(s) SubCutaneous before dinner  levoFLOXacin IVPB      levoFLOXacin IVPB 500 milliGRAM(s) IV Intermittent every 24 hours  nystatin    Suspension 348145 Unit(s) Oral two times a day  pneumococcal  23 Vaccine (PNEUMOVAX) 0.5 milliLiter(s) IntraMuscular once  polyethylene glycol 3350 17 Gram(s) Oral daily    MEDICATIONS  (PRN):  acetaminophen   Tablet 650 milliGRAM(s) Oral every 6 hours PRN For Temp greater than 38 C (100.4 F)  acetaminophen   Tablet. 650 milliGRAM(s) Oral every 6 hours PRN Mild Pain (1 - 3)  dextrose Gel 1 Dose(s) Oral once PRN Blood Glucose LESS THAN 70 milliGRAM(s)/deciliter  glucagon  Injectable 1 milliGRAM(s) IntraMuscular once PRN Glucose LESS THAN 70 milligrams/deciliter  senna 2 Tablet(s) Oral at bedtime PRN Constipation      Allergies    No Known Allergies    Intolerances        REVIEW OF SYSTEMS:  CONSTITUTIONAL:   EYES: No eye pain, visual disturbances, or discharge  ENMT:  No difficulty hearing, tinnitus, vertigo; No sinus or throat pain  NECK: No pain or stiffness  RESPIRATORY: No cough, wheezing, chills or hemoptysis; No shortness of breath  CARDIOVASCULAR: No chest pain, palpitations, dizziness, or leg swelling  GASTROINTESTINAL: No abdominal or epigastric pain. No nausea, vomiting, or hematemesis; No diarrhea or constipation. No melena or hematochezia.  GENITOURINARY: No dysuria, frequency, hematuria, or incontinence  NEUROLOGICAL: No headaches, memory loss, loss of strength, numbness, or tremors  SKIN: No itching, burning, rashes, or lesions   LYMPH NODES: No enlarged glands  ENDOCRINE: No heat or cold intolerance; No hair loss  MUSCULOSKELETAL: No joint pain or swelling; No muscle, back, or extremity pain  PSYCHIATRIC: No depression, anxiety, mood swings, or difficulty sleeping  HEME/LYMPH: No easy bruising, or bleeding gums  ALLERY AND IMMUNOLOGIC: No hives or eczema    Vital Signs Last 24 Hrs  T(C): 37.3 (22 Jan 2018 11:57), Max: 37.3 (22 Jan 2018 00:00)  T(F): 99.2 (22 Jan 2018 11:57), Max: 99.2 (22 Jan 2018 11:57)  HR: 79 (22 Jan 2018 11:57) (70 - 84)  BP: 130/82 (22 Jan 2018 11:57) (128/75 - 149/99)  BP(mean): --  RR: 16 (22 Jan 2018 11:57) (16 - 17)  SpO2: 98% (22 Jan 2018 11:57) (96% - 100%)    PHYSICAL EXAM:  GENERAL:   HEAD:  Atraumatic, Normocephalic  EYES: EOMI, PERRLA, conjunctiva and sclera clear  ENMT: No tonsillar erythema, exudates, or enlargement; Moist mucous membranes, Good dentition, No lesions  NECK: Supple, No JVD, Normal thyroid  NERVOUS SYSTEM:  Alert & Oriented X3, Good concentration; Motor Strength 5/5 B/L upper and lower extremities; DTRs 2+ intact and symmetric  CHEST/LUNG: Clear to percussion bilaterally; No rales, rhonchi, wheezing, or rubs  HEART: Regular rate and rhythm; No murmurs, rubs, or gallops  ABDOMEN: Soft, Nontender, Nondistended; Bowel sounds present  EXTREMITIES:  2+ Peripheral Pulses, No clubbing, cyanosis, or edema  SKIN: No rashes or lesions    LABS:        CAPILLARY BLOOD GLUCOSE      POCT Blood Glucose.: 262 mg/dL (22 Jan 2018 11:24)  POCT Blood Glucose.: 204 mg/dL (22 Jan 2018 08:05)  POCT Blood Glucose.: 344 mg/dL (21 Jan 2018 22:17)  POCT Blood Glucose.: 304 mg/dL (21 Jan 2018 16:49)    Lipid panel:           Thyroid:  Diabetes Tests:  Parathyroid Panel:  Adrenals:  RADIOLOGY & ADDITIONAL TESTS:    Imaging Personally Reviewed:  [ ] YES  [ ] NO    Consultant(s) Notes Reviewed:  [ ] YES  [ ] NO    Care Discussed with Consultants/Other Providers [ ] YES  [ ] NO

## 2018-01-23 LAB
ANION GAP SERPL CALC-SCNC: 8 MMOL/L — SIGNIFICANT CHANGE UP (ref 5–17)
BASOPHILS # BLD AUTO: 0.1 K/UL — SIGNIFICANT CHANGE UP (ref 0–0.2)
BASOPHILS NFR BLD AUTO: 0.6 % — SIGNIFICANT CHANGE UP (ref 0–2)
BUN SERPL-MCNC: 14 MG/DL — SIGNIFICANT CHANGE UP (ref 7–23)
CALCIUM SERPL-MCNC: 8 MG/DL — LOW (ref 8.5–10.1)
CHLORIDE SERPL-SCNC: 102 MMOL/L — SIGNIFICANT CHANGE UP (ref 96–108)
CO2 SERPL-SCNC: 29 MMOL/L — SIGNIFICANT CHANGE UP (ref 22–31)
CREAT SERPL-MCNC: 0.98 MG/DL — SIGNIFICANT CHANGE UP (ref 0.5–1.3)
EOSINOPHIL # BLD AUTO: 0 K/UL — SIGNIFICANT CHANGE UP (ref 0–0.5)
EOSINOPHIL NFR BLD AUTO: 0.3 % — SIGNIFICANT CHANGE UP (ref 0–6)
GLUCOSE BLDC GLUCOMTR-MCNC: 216 MG/DL — HIGH (ref 70–99)
GLUCOSE BLDC GLUCOMTR-MCNC: 237 MG/DL — HIGH (ref 70–99)
GLUCOSE BLDC GLUCOMTR-MCNC: 313 MG/DL — HIGH (ref 70–99)
GLUCOSE BLDC GLUCOMTR-MCNC: 357 MG/DL — HIGH (ref 70–99)
GLUCOSE SERPL-MCNC: 222 MG/DL — HIGH (ref 70–99)
HCT VFR BLD CALC: 38.5 % — LOW (ref 39–50)
HGB BLD-MCNC: 12.4 G/DL — LOW (ref 13–17)
LYMPHOCYTES # BLD AUTO: 18.1 % — SIGNIFICANT CHANGE UP (ref 13–44)
LYMPHOCYTES # BLD AUTO: 2.1 K/UL — SIGNIFICANT CHANGE UP (ref 1–3.3)
MAGNESIUM SERPL-MCNC: 2.1 MG/DL — SIGNIFICANT CHANGE UP (ref 1.6–2.6)
MCHC RBC-ENTMCNC: 22.6 PG — LOW (ref 27–34)
MCHC RBC-ENTMCNC: 32.3 GM/DL — SIGNIFICANT CHANGE UP (ref 32–36)
MCV RBC AUTO: 69.9 FL — LOW (ref 80–100)
MONOCYTES # BLD AUTO: 1.1 K/UL — HIGH (ref 0–0.9)
MONOCYTES NFR BLD AUTO: 9.2 % — SIGNIFICANT CHANGE UP (ref 2–14)
NEUTROPHILS # BLD AUTO: 8.4 K/UL — HIGH (ref 1.8–7.4)
NEUTROPHILS NFR BLD AUTO: 71.8 % — SIGNIFICANT CHANGE UP (ref 43–77)
PHOSPHATE SERPL-MCNC: 2.4 MG/DL — LOW (ref 2.5–4.5)
PLATELET # BLD AUTO: 294 K/UL — SIGNIFICANT CHANGE UP (ref 150–400)
POTASSIUM SERPL-MCNC: 3.3 MMOL/L — LOW (ref 3.5–5.3)
POTASSIUM SERPL-SCNC: 3.3 MMOL/L — LOW (ref 3.5–5.3)
RBC # BLD: 5.51 M/UL — SIGNIFICANT CHANGE UP (ref 4.2–5.8)
RBC # FLD: 12.9 % — SIGNIFICANT CHANGE UP (ref 11–15)
SODIUM SERPL-SCNC: 139 MMOL/L — SIGNIFICANT CHANGE UP (ref 135–145)
WBC # BLD: 11.6 K/UL — HIGH (ref 3.8–10.5)
WBC # FLD AUTO: 11.6 K/UL — HIGH (ref 3.8–10.5)

## 2018-01-23 PROCEDURE — 99233 SBSQ HOSP IP/OBS HIGH 50: CPT

## 2018-01-23 PROCEDURE — 93970 EXTREMITY STUDY: CPT | Mod: 26

## 2018-01-23 RX ORDER — POTASSIUM CHLORIDE 20 MEQ
40 PACKET (EA) ORAL ONCE
Qty: 0 | Refills: 0 | Status: COMPLETED | OUTPATIENT
Start: 2018-01-23 | End: 2018-01-23

## 2018-01-23 RX ORDER — METFORMIN HYDROCHLORIDE 850 MG/1
500 TABLET ORAL
Qty: 0 | Refills: 0 | Status: DISCONTINUED | OUTPATIENT
Start: 2018-01-23 | End: 2018-01-23

## 2018-01-23 RX ORDER — METFORMIN HYDROCHLORIDE 850 MG/1
500 TABLET ORAL
Qty: 0 | Refills: 0 | Status: DISCONTINUED | OUTPATIENT
Start: 2018-01-23 | End: 2018-01-24

## 2018-01-23 RX ORDER — ENOXAPARIN SODIUM 100 MG/ML
40 INJECTION SUBCUTANEOUS
Qty: 0 | Refills: 0 | Status: DISCONTINUED | OUTPATIENT
Start: 2018-01-23 | End: 2018-01-24

## 2018-01-23 RX ADMIN — INSULIN HUMAN 7 UNIT(S): 100 INJECTION, SOLUTION SUBCUTANEOUS at 08:02

## 2018-01-23 RX ADMIN — Medication 1 TABLET(S): at 17:10

## 2018-01-23 RX ADMIN — Medication 500000 UNIT(S): at 10:59

## 2018-01-23 RX ADMIN — ENOXAPARIN SODIUM 40 MILLIGRAM(S): 100 INJECTION SUBCUTANEOUS at 11:02

## 2018-01-23 RX ADMIN — INSULIN GLARGINE 20 UNIT(S): 100 INJECTION, SOLUTION SUBCUTANEOUS at 22:08

## 2018-01-23 RX ADMIN — Medication 100 MILLIGRAM(S): at 13:44

## 2018-01-23 RX ADMIN — INSULIN HUMAN 7 UNIT(S): 100 INJECTION, SOLUTION SUBCUTANEOUS at 10:59

## 2018-01-23 RX ADMIN — Medication 8: at 11:00

## 2018-01-23 RX ADMIN — Medication 4: at 16:03

## 2018-01-23 RX ADMIN — Medication 100 MILLIGRAM(S): at 05:15

## 2018-01-23 RX ADMIN — Medication 10: at 22:08

## 2018-01-23 RX ADMIN — Medication 1 TABLET(S): at 11:02

## 2018-01-23 RX ADMIN — Medication 4: at 08:02

## 2018-01-23 RX ADMIN — Medication 100 MILLIGRAM(S): at 22:07

## 2018-01-23 RX ADMIN — METFORMIN HYDROCHLORIDE 500 MILLIGRAM(S): 850 TABLET ORAL at 11:30

## 2018-01-23 RX ADMIN — Medication 40 MILLIEQUIVALENT(S): at 22:07

## 2018-01-23 RX ADMIN — METFORMIN HYDROCHLORIDE 500 MILLIGRAM(S): 850 TABLET ORAL at 17:10

## 2018-01-23 RX ADMIN — INSULIN HUMAN 7 UNIT(S): 100 INJECTION, SOLUTION SUBCUTANEOUS at 16:04

## 2018-01-23 RX ADMIN — Medication 1 TABLET(S): at 08:02

## 2018-01-23 RX ADMIN — Medication 1 TABLET(S): at 22:07

## 2018-01-23 RX ADMIN — Medication 500000 UNIT(S): at 22:08

## 2018-01-23 RX ADMIN — AMLODIPINE BESYLATE 2.5 MILLIGRAM(S): 2.5 TABLET ORAL at 05:15

## 2018-01-23 NOTE — PROGRESS NOTE ADULT - SUBJECTIVE AND OBJECTIVE BOX
57y old Male w/ history of HTN & DM who presents with a chief complaint of sore throat and general weakness  and was found to have oral thrush and suspected pharyngitis.    INTERVAL HPI/OVERNIGHT EVENTS:    MEDICATIONS  (STANDING):  amLODIPine   Tablet 2.5 milliGRAM(s) Oral daily  dextrose 5%. 1000 milliLiter(s) (50 mL/Hr) IV Continuous <Continuous>  dextrose 50% Injectable 12.5 Gram(s) IV Push once  dextrose 50% Injectable 25 Gram(s) IV Push once  dextrose 50% Injectable 25 Gram(s) IV Push once  docusate sodium 100 milliGRAM(s) Oral three times a day  enoxaparin Injectable 40 milliGRAM(s) SubCutaneous daily  insulin glargine Injectable (LANTUS) 20 Unit(s) SubCutaneous at bedtime  insulin lispro (HumaLOG) corrective regimen sliding scale   SubCutaneous Before meals and at bedtime  insulin regular  human recombinant 7 Unit(s) SubCutaneous before breakfast  insulin regular  human recombinant 7 Unit(s) SubCutaneous before lunch  insulin regular  human recombinant 7 Unit(s) SubCutaneous before dinner  levoFLOXacin  Tablet 500 milliGRAM(s) Oral every 24 hours  metFORMIN 500 milliGRAM(s) Oral two times a day with meals  nystatin    Suspension 176604 Unit(s) Oral two times a day  pneumococcal  23 Vaccine (PNEUMOVAX) 0.5 milliLiter(s) IntraMuscular once  polyethylene glycol 3350 17 Gram(s) Oral daily  potassium acid phosphate/sodium acid phosphate tablet (K-PHOS No. 2) 1 Tablet(s) Oral four times a day with meals    MEDICATIONS  (PRN):  acetaminophen   Tablet 650 milliGRAM(s) Oral every 6 hours PRN For Temp greater than 38 C (100.4 F)  acetaminophen   Tablet. 650 milliGRAM(s) Oral every 6 hours PRN Mild Pain (1 - 3)  dextrose Gel 1 Dose(s) Oral once PRN Blood Glucose LESS THAN 70 milliGRAM(s)/deciliter  glucagon  Injectable 1 milliGRAM(s) IntraMuscular once PRN Glucose LESS THAN 70 milligrams/deciliter  senna 2 Tablet(s) Oral at bedtime PRN Constipation      Allergies    No Known Allergies    Intolerances    Vital Signs Last 24 Hrs  T(C): 36.7 (23 Jan 2018 17:37), Max: 37.3 (23 Jan 2018 00:12)  T(F): 98 (23 Jan 2018 17:37), Max: 99.1 (23 Jan 2018 00:12)  HR: 98 (23 Jan 2018 17:37) (80 - 98)  BP: 119/88 (23 Jan 2018 17:37) (119/88 - 147/93)  BP(mean): --  RR: 18 (23 Jan 2018 17:37) (16 - 18)  SpO2: 98% (23 Jan 2018 17:37) (95% - 98%)    PHYSICAL EXAM:  GENERAL: NAD, well-groomed, well-developed  HEAD:  Atraumatic, Normocephalic  EYES: EOMI, PERRLA, conjunctiva and sclera clear  NECK: Supple  NERVOUS SYSTEM:  Alert & Oriented X3  CHEST/LUNG: Clear to auscultation bilaterally; No rales, rhonchi, wheezing, or rubs  HEART: Regular rate and rhythm; No murmurs, rubs, or gallops  ABDOMEN: Soft, Nontender, Nondistended; Bowel sounds present  EXTREMITIES: No edema    LABS:                        12.4   11.6  )-----------( 294      ( 23 Jan 2018 09:52 )             38.5     01-23    139  |  102  |  14  ----------------------------<  222<H>  3.3<L>   |  29  |  0.98    Ca    8.0<L>      23 Jan 2018 09:52  Phos  2.4     01-23  Mg     2.1     01-23          CAPILLARY BLOOD GLUCOSE      POCT Blood Glucose.: 237 mg/dL (23 Jan 2018 16:03)  POCT Blood Glucose.: 313 mg/dL (23 Jan 2018 10:57)  POCT Blood Glucose.: 216 mg/dL (23 Jan 2018 07:59)  POCT Blood Glucose.: 463 mg/dL (22 Jan 2018 21:28)  POCT Blood Glucose.: 469 mg/dL (22 Jan 2018 21:27)      Culture - Blood (collected 19 Jan 2018 08:28)  Source: .Blood Blood  Preliminary Report (20 Jan 2018 09:00):    No growth to date.    Culture - Blood (collected 19 Jan 2018 08:28)  Source: .Blood Blood  Preliminary Report (20 Jan 2018 09:00):    No growth to date.      RADIOLOGY & ADDITIONAL TESTS:    01-22-18 @ 07:01 - 01-23-18 @ 07:00  --------------------------------------------------------  IN:    Oral Fluid: 875 mL  Total IN: 875 mL    OUT:  Total OUT: 0 mL    Total NET: 875 mL 57y old Male w/ history of HTN & DM who presents with a chief complaint of sore throat and general weakness  and was found to have oral thrush and suspected pharyngitis.    INTERVAL HPI/OVERNIGHT EVENTS:    MEDICATIONS  (STANDING):  amLODIPine   Tablet 2.5 milliGRAM(s) Oral daily  dextrose 5%. 1000 milliLiter(s) (50 mL/Hr) IV Continuous <Continuous>  dextrose 50% Injectable 12.5 Gram(s) IV Push once  dextrose 50% Injectable 25 Gram(s) IV Push once  dextrose 50% Injectable 25 Gram(s) IV Push once  docusate sodium 100 milliGRAM(s) Oral three times a day  enoxaparin Injectable 40 milliGRAM(s) SubCutaneous daily  insulin glargine Injectable (LANTUS) 20 Unit(s) SubCutaneous at bedtime  insulin lispro (HumaLOG) corrective regimen sliding scale   SubCutaneous Before meals and at bedtime  insulin regular  human recombinant 7 Unit(s) SubCutaneous before breakfast  insulin regular  human recombinant 7 Unit(s) SubCutaneous before lunch  insulin regular  human recombinant 7 Unit(s) SubCutaneous before dinner  levoFLOXacin  Tablet 500 milliGRAM(s) Oral every 24 hours  metFORMIN 500 milliGRAM(s) Oral two times a day with meals  nystatin    Suspension 654329 Unit(s) Oral two times a day  pneumococcal  23 Vaccine (PNEUMOVAX) 0.5 milliLiter(s) IntraMuscular once  polyethylene glycol 3350 17 Gram(s) Oral daily  potassium acid phosphate/sodium acid phosphate tablet (K-PHOS No. 2) 1 Tablet(s) Oral four times a day with meals    MEDICATIONS  (PRN):  acetaminophen   Tablet 650 milliGRAM(s) Oral every 6 hours PRN For Temp greater than 38 C (100.4 F)  acetaminophen   Tablet. 650 milliGRAM(s) Oral every 6 hours PRN Mild Pain (1 - 3)  dextrose Gel 1 Dose(s) Oral once PRN Blood Glucose LESS THAN 70 milliGRAM(s)/deciliter  glucagon  Injectable 1 milliGRAM(s) IntraMuscular once PRN Glucose LESS THAN 70 milligrams/deciliter  senna 2 Tablet(s) Oral at bedtime PRN Constipation      Allergies    No Known Allergies    Intolerances    Vital Signs Last 24 Hrs  T(C): 36.7 (23 Jan 2018 17:37), Max: 37.3 (23 Jan 2018 00:12)  T(F): 98 (23 Jan 2018 17:37), Max: 99.1 (23 Jan 2018 00:12)  HR: 98 (23 Jan 2018 17:37) (80 - 98)  BP: 119/88 (23 Jan 2018 17:37) (119/88 - 147/93)  BP(mean): --  RR: 18 (23 Jan 2018 17:37) (16 - 18)  SpO2: 98% (23 Jan 2018 17:37) (95% - 98%)    PHYSICAL EXAM:  GENERAL: NAD, well-groomed, well-developed  HEAD:  Atraumatic, Normocephalic  EYES: EOMI, PERRLA, conjunctiva and sclera clear  NECK: Supple  NERVOUS SYSTEM:  Alert & Oriented X3  CHEST/LUNG: Clear to auscultation bilaterally; No rales, rhonchi, wheezing, or rubs  HEART: Regular rate and rhythm; No murmurs, rubs, or gallops  ABDOMEN: Soft, Nontender, Nondistended; Bowel sounds present  EXTREMITIES: RLE edema    LABS:                        12.4   11.6  )-----------( 294      ( 23 Jan 2018 09:52 )             38.5     01-23    139  |  102  |  14  ----------------------------<  222<H>  3.3<L>   |  29  |  0.98    Ca    8.0<L>      23 Jan 2018 09:52  Phos  2.4     01-23  Mg     2.1     01-23          CAPILLARY BLOOD GLUCOSE      POCT Blood Glucose.: 237 mg/dL (23 Jan 2018 16:03)  POCT Blood Glucose.: 313 mg/dL (23 Jan 2018 10:57)  POCT Blood Glucose.: 216 mg/dL (23 Jan 2018 07:59)  POCT Blood Glucose.: 463 mg/dL (22 Jan 2018 21:28)  POCT Blood Glucose.: 469 mg/dL (22 Jan 2018 21:27)      Culture - Blood (collected 19 Jan 2018 08:28)  Source: .Blood Blood  Preliminary Report (20 Jan 2018 09:00):    No growth to date.    Culture - Blood (collected 19 Jan 2018 08:28)  Source: .Blood Blood  Preliminary Report (20 Jan 2018 09:00):    No growth to date.      RADIOLOGY & ADDITIONAL TESTS:    01-22-18 @ 07:01 - 01-23-18 @ 07:00  --------------------------------------------------------  IN:    Oral Fluid: 875 mL  Total IN: 875 mL    OUT:  Total OUT: 0 mL    Total NET: 875 mL

## 2018-01-23 NOTE — PROGRESS NOTE ADULT - SUBJECTIVE AND OBJECTIVE BOX
Patient is a 57y old  Male who presents with a chief complaint of sore throat and general weakness (18 Jan 2018 19:31)      Interval History: finger sticks are increased   on Lantus and prandial lispro and Lispro sliding scale coverage with meals   will add Metformin     MEDICATIONS  (STANDING):  amLODIPine   Tablet 2.5 milliGRAM(s) Oral daily  dextrose 5%. 1000 milliLiter(s) (50 mL/Hr) IV Continuous <Continuous>  dextrose 50% Injectable 12.5 Gram(s) IV Push once  dextrose 50% Injectable 25 Gram(s) IV Push once  dextrose 50% Injectable 25 Gram(s) IV Push once  docusate sodium 100 milliGRAM(s) Oral three times a day  enoxaparin Injectable 40 milliGRAM(s) SubCutaneous daily  insulin glargine Injectable (LANTUS) 20 Unit(s) SubCutaneous at bedtime  insulin lispro (HumaLOG) corrective regimen sliding scale   SubCutaneous Before meals and at bedtime  insulin regular  human recombinant 7 Unit(s) SubCutaneous before breakfast  insulin regular  human recombinant 7 Unit(s) SubCutaneous before lunch  insulin regular  human recombinant 7 Unit(s) SubCutaneous before dinner  levoFLOXacin  Tablet 500 milliGRAM(s) Oral every 24 hours  metFORMIN 500 milliGRAM(s) Oral two times a day with meals  metFORMIN 500 milliGRAM(s) Oral two times a day with meals  nystatin    Suspension 760468 Unit(s) Oral two times a day  pneumococcal  23 Vaccine (PNEUMOVAX) 0.5 milliLiter(s) IntraMuscular once  polyethylene glycol 3350 17 Gram(s) Oral daily  potassium acid phosphate/sodium acid phosphate tablet (K-PHOS No. 2) 1 Tablet(s) Oral four times a day with meals    MEDICATIONS  (PRN):  acetaminophen   Tablet 650 milliGRAM(s) Oral every 6 hours PRN For Temp greater than 38 C (100.4 F)  acetaminophen   Tablet. 650 milliGRAM(s) Oral every 6 hours PRN Mild Pain (1 - 3)  dextrose Gel 1 Dose(s) Oral once PRN Blood Glucose LESS THAN 70 milliGRAM(s)/deciliter  glucagon  Injectable 1 milliGRAM(s) IntraMuscular once PRN Glucose LESS THAN 70 milligrams/deciliter  senna 2 Tablet(s) Oral at bedtime PRN Constipation      Allergies    No Known Allergies    Intolerances        REVIEW OF SYSTEMS:  CONSTITUTIONAL: stable   EYES: No eye pain, visual disturbances, or discharge  ENMT:  No difficulty hearing, tinnitus, vertigo; No sinus or throat pain  NECK: No pain or stiffness  RESPIRATORY: No cough, wheezing, chills or hemoptysis; No shortness of breath  CARDIOVASCULAR: No chest pain, palpitations, dizziness, or leg swelling  GASTROINTESTINAL: No abdominal or epigastric pain. No nausea, vomiting, or hematemesis; No diarrhea or constipation. No melena or hematochezia.  GENITOURINARY: No dysuria, frequency, hematuria, or incontinence  NEUROLOGICAL: No headaches, memory loss, loss of strength, numbness, or tremors  SKIN: No itching, burning, rashes, or lesions   LYMPH NODES: No enlarged glands  ENDOCRINE: No heat or cold intolerance; No hair loss, no polyuria  MUSCULOSKELETAL: No joint pain or swelling; No muscle, back, or extremity pain  PSYCHIATRIC: No depression, anxiety, mood swings, or difficulty sleeping  HEME/LYMPH: No easy bruising, or bleeding gums  ALLERY AND IMMUNOLOGIC: No hives or eczema    Vital Signs Last 24 Hrs  T(C): 36.3 (23 Jan 2018 13:46), Max: 37.4 (22 Jan 2018 17:28)  T(F): 97.4 (23 Jan 2018 13:46), Max: 99.4 (22 Jan 2018 17:28)  HR: 80 (23 Jan 2018 13:46) (80 - 86)  BP: 140/88 (23 Jan 2018 13:46) (140/88 - 159/84)  BP(mean): --  RR: 18 (23 Jan 2018 13:46) (16 - 18)  SpO2: 97% (23 Jan 2018 13:46) (93% - 97%)    PHYSICAL EXAM:  GENERAL: no changes  HEAD:  Atraumatic, Normocephalic  EYES: EOMI, PERRLA, conjunctiva and sclera clear  ENMT: No tonsillar erythema, exudates, or enlargement; Moist mucous membranes, Good dentition, No lesions  NECK: Supple, No JVD, Normal thyroid  NERVOUS SYSTEM:  Alert & Oriented X3, Good concentration; Motor Strength 5/5 B/L upper and lower extremities; DTRs 2+ intact and symmetric  CHEST/LUNG: Clear to percussion bilaterally; No rales, rhonchi, wheezing, or rubs  HEART: Regular rate and rhythm; No murmurs, rubs, or gallops  ABDOMEN: Soft, Nontender, Nondistended; Bowel sounds present  EXTREMITIES:  2+ Peripheral Pulses, No clubbing, cyanosis, or edema  SKIN: No rashes or lesions    LABS:        CAPILLARY BLOOD GLUCOSE      POCT Blood Glucose.: 313 mg/dL (23 Jan 2018 10:57)  POCT Blood Glucose.: 216 mg/dL (23 Jan 2018 07:59)  POCT Blood Glucose.: 463 mg/dL (22 Jan 2018 21:28)  POCT Blood Glucose.: 469 mg/dL (22 Jan 2018 21:27)  POCT Blood Glucose.: 285 mg/dL (22 Jan 2018 15:49)    Lipid panel:           Thyroid:  Diabetes Tests:  Parathyroid Panel:  Adrenals:  RADIOLOGY & ADDITIONAL TESTS:    Imaging Personally Reviewed:  [ ] YES  [ ] NO    Consultant(s) Notes Reviewed:  [ ] YES  [ ] NO    Care Discussed with Consultants/Other Providers [ ] YES  [ ] NO

## 2018-01-23 NOTE — PROGRESS NOTE ADULT - ASSESSMENT
56 y/o male with history of DM & HTN presented with sore throat and generalized weakness x 2 weeks and was found to have oral thrush and suspected  bacterial pharyngitis.     Possible pharyngitis with viral syndrome  - Blood cultures NGTD  - Rapid flu negative / RVP negative   - CXR on 1/18 showed no focal airspace opacities  - c/w Levaquin 500mg to PO  - leukocytosis has returned    Thrush  - likely due to uncontrolled DM  - c/w nystatin   - HIV negative    Newly diagnosed Diabetes  - c/w premeal finger sticks and insulin sliding scale coverage  - Hgb A1C = 15.5 on 1/19  - Endo following & added metformin  - c/w ISS, Lantus & prandial lispro     PHILLIP   - likely prerenal from dehydration   - s/p about 2L IVF in ER   - now resolved    Hypertension  - c/w Amlodipine 2.5mg   - c/w low sodium diet    Hypophosphatemia  - c/w  PO phos    hypokalemia  - c/w PO KCl    Hypernatremia  - resolved w/ IVF    Prophylaxis:  DVT: Lovenox  GI: PO diet 56 y/o male with history of DM & HTN presented with sore throat and generalized weakness x 2 weeks and was found to have oral thrush and suspected  bacterial pharyngitis.     Possible pharyngitis with viral syndrome  - Blood cultures NGTD  - Rapid flu negative / RVP negative   - CXR on 1/18 showed no focal airspace opacities  - c/w Levaquin 500mg to PO  - leukocytosis has returned, will recheck CBC & CRP    Thrush  - likely due to uncontrolled DM  - c/w nystatin   - HIV negative    Newly diagnosed Diabetes  - c/w premeal finger sticks and insulin sliding scale coverage  - Hgb A1C = 15.5 on 1/19  - Endo following & added metformin  - c/w ISS, Lantus & prandial lispro     RLE edema  - check LE doppler    PHILLIP   - likely prerenal from dehydration   - s/p about 2L IVF in ER   - now resolved    Hypertension  - c/w Amlodipine 2.5mg   - c/w low sodium diet    Hypophosphatemia  - c/w  PO phos    hypokalemia  - give PO KCl    Hypernatremia  - resolved w/ IVF    Prophylaxis:  DVT: Lovenox  GI: PO diet 58 y/o male with history of DM & HTN presented with sore throat and generalized weakness x 2 weeks and was found to have oral thrush and suspected  bacterial pharyngitis.     Possible pharyngitis with viral syndrome  - Blood cultures NGTD  - Rapid flu negative / RVP negative   - CXR on 1/18 showed no focal airspace opacities  - c/w Levaquin 500mg to PO  - leukocytosis has returned, will recheck CBC & CRP    Thrush  - likely due to uncontrolled DM  - c/w nystatin   - HIV negative    Newly diagnosed hyperosmolar Diabetes  - c/w premeal finger sticks and insulin sliding scale coverage  - Hgb A1C = 15.5 on 1/19  - Endo following & added metformin  - c/w ISS, Lantus & prandial lispro     RLE edema  - check LE doppler    PHILLIP   - likely prerenal from dehydration   - s/p about 2L IVF in ER   - now resolved    Hypertension  - c/w Amlodipine 2.5mg   - c/w low sodium diet    Hypophosphatemia  - c/w  PO phos    hypokalemia  - give PO KCl    Hypernatremia  - resolved w/ IVF    Prophylaxis:  DVT: Lovenox  GI: PO diet

## 2018-01-24 LAB
ANION GAP SERPL CALC-SCNC: 7 MMOL/L — SIGNIFICANT CHANGE UP (ref 5–17)
APPEARANCE UR: CLEAR — SIGNIFICANT CHANGE UP
BILIRUB UR-MCNC: NEGATIVE — SIGNIFICANT CHANGE UP
BUN SERPL-MCNC: 11 MG/DL — SIGNIFICANT CHANGE UP (ref 7–23)
CALCIUM SERPL-MCNC: 7.8 MG/DL — LOW (ref 8.5–10.1)
CHLORIDE SERPL-SCNC: 104 MMOL/L — SIGNIFICANT CHANGE UP (ref 96–108)
CO2 SERPL-SCNC: 28 MMOL/L — SIGNIFICANT CHANGE UP (ref 22–31)
COLOR SPEC: YELLOW — SIGNIFICANT CHANGE UP
CREAT SERPL-MCNC: 0.86 MG/DL — SIGNIFICANT CHANGE UP (ref 0.5–1.3)
CRP SERPL-MCNC: 8 MG/DL — HIGH (ref 0–0.4)
CULTURE RESULTS: SIGNIFICANT CHANGE UP
CULTURE RESULTS: SIGNIFICANT CHANGE UP
DIFF PNL FLD: NEGATIVE — SIGNIFICANT CHANGE UP
GLUCOSE BLDC GLUCOMTR-MCNC: 133 MG/DL — HIGH (ref 70–99)
GLUCOSE BLDC GLUCOMTR-MCNC: 178 MG/DL — HIGH (ref 70–99)
GLUCOSE BLDC GLUCOMTR-MCNC: 216 MG/DL — HIGH (ref 70–99)
GLUCOSE BLDC GLUCOMTR-MCNC: 234 MG/DL — HIGH (ref 70–99)
GLUCOSE SERPL-MCNC: 188 MG/DL — HIGH (ref 70–99)
GLUCOSE UR QL: 100 MG/DL
HCT VFR BLD CALC: 35 % — LOW (ref 39–50)
HGB BLD-MCNC: 11.3 G/DL — LOW (ref 13–17)
KETONES UR-MCNC: NEGATIVE — SIGNIFICANT CHANGE UP
LEUKOCYTE ESTERASE UR-ACNC: NEGATIVE — SIGNIFICANT CHANGE UP
MAGNESIUM SERPL-MCNC: 1.9 MG/DL — SIGNIFICANT CHANGE UP (ref 1.6–2.6)
MCHC RBC-ENTMCNC: 21.7 PG — LOW (ref 27–34)
MCHC RBC-ENTMCNC: 32.3 GM/DL — SIGNIFICANT CHANGE UP (ref 32–36)
MCV RBC AUTO: 67.3 FL — LOW (ref 80–100)
NITRITE UR-MCNC: NEGATIVE — SIGNIFICANT CHANGE UP
NRBC # BLD: 0 /100 WBCS — SIGNIFICANT CHANGE UP (ref 0–0)
PH UR: 6.5 — SIGNIFICANT CHANGE UP (ref 5–8)
PHOSPHATE SERPL-MCNC: 2.5 MG/DL — SIGNIFICANT CHANGE UP (ref 2.5–4.5)
PLATELET # BLD AUTO: 294 K/UL — SIGNIFICANT CHANGE UP (ref 150–400)
POTASSIUM SERPL-MCNC: 3.6 MMOL/L — SIGNIFICANT CHANGE UP (ref 3.5–5.3)
POTASSIUM SERPL-SCNC: 3.6 MMOL/L — SIGNIFICANT CHANGE UP (ref 3.5–5.3)
PROT UR-MCNC: NEGATIVE MG/DL — SIGNIFICANT CHANGE UP
RBC # BLD: 5.2 M/UL — SIGNIFICANT CHANGE UP (ref 4.2–5.8)
RBC # FLD: 13.8 % — SIGNIFICANT CHANGE UP (ref 10.3–14.5)
SODIUM SERPL-SCNC: 139 MMOL/L — SIGNIFICANT CHANGE UP (ref 135–145)
SP GR SPEC: 1 — LOW (ref 1.01–1.02)
SPECIMEN SOURCE: SIGNIFICANT CHANGE UP
SPECIMEN SOURCE: SIGNIFICANT CHANGE UP
UROBILINOGEN FLD QL: 4 MG/DL
WBC # BLD: 12.14 K/UL — HIGH (ref 3.8–10.5)
WBC # FLD AUTO: 12.14 K/UL — HIGH (ref 3.8–10.5)

## 2018-01-24 PROCEDURE — 99233 SBSQ HOSP IP/OBS HIGH 50: CPT

## 2018-01-24 PROCEDURE — 71275 CT ANGIOGRAPHY CHEST: CPT | Mod: 26

## 2018-01-24 RX ORDER — ENOXAPARIN SODIUM 100 MG/ML
50 INJECTION SUBCUTANEOUS ONCE
Qty: 0 | Refills: 0 | Status: COMPLETED | OUTPATIENT
Start: 2018-01-24 | End: 2018-01-24

## 2018-01-24 RX ORDER — ENOXAPARIN SODIUM 100 MG/ML
80 INJECTION SUBCUTANEOUS
Qty: 0 | Refills: 0 | Status: DISCONTINUED | OUTPATIENT
Start: 2018-01-24 | End: 2018-01-24

## 2018-01-24 RX ORDER — APIXABAN 2.5 MG/1
10 TABLET, FILM COATED ORAL EVERY 12 HOURS
Qty: 0 | Refills: 0 | Status: DISCONTINUED | OUTPATIENT
Start: 2018-01-24 | End: 2018-01-25

## 2018-01-24 RX ORDER — ENOXAPARIN SODIUM 100 MG/ML
90 INJECTION SUBCUTANEOUS
Qty: 0 | Refills: 0 | Status: DISCONTINUED | OUTPATIENT
Start: 2018-01-24 | End: 2018-01-24

## 2018-01-24 RX ADMIN — INSULIN GLARGINE 20 UNIT(S): 100 INJECTION, SOLUTION SUBCUTANEOUS at 22:14

## 2018-01-24 RX ADMIN — Medication 500000 UNIT(S): at 22:18

## 2018-01-24 RX ADMIN — Medication 1 TABLET(S): at 22:19

## 2018-01-24 RX ADMIN — SENNA PLUS 2 TABLET(S): 8.6 TABLET ORAL at 22:14

## 2018-01-24 RX ADMIN — Medication 4: at 17:12

## 2018-01-24 RX ADMIN — INSULIN HUMAN 7 UNIT(S): 100 INJECTION, SOLUTION SUBCUTANEOUS at 11:29

## 2018-01-24 RX ADMIN — Medication 4: at 22:15

## 2018-01-24 RX ADMIN — ENOXAPARIN SODIUM 40 MILLIGRAM(S): 100 INJECTION SUBCUTANEOUS at 00:27

## 2018-01-24 RX ADMIN — Medication 2: at 07:43

## 2018-01-24 RX ADMIN — Medication 500000 UNIT(S): at 09:57

## 2018-01-24 RX ADMIN — Medication 1 TABLET(S): at 11:30

## 2018-01-24 RX ADMIN — Medication 1 TABLET(S): at 07:43

## 2018-01-24 RX ADMIN — Medication 100 MILLIGRAM(S): at 14:23

## 2018-01-24 RX ADMIN — Medication 100 MILLIGRAM(S): at 05:28

## 2018-01-24 RX ADMIN — ENOXAPARIN SODIUM 50 MILLIGRAM(S): 100 INJECTION SUBCUTANEOUS at 04:40

## 2018-01-24 RX ADMIN — Medication 100 MILLIGRAM(S): at 22:14

## 2018-01-24 RX ADMIN — APIXABAN 10 MILLIGRAM(S): 2.5 TABLET, FILM COATED ORAL at 17:12

## 2018-01-24 RX ADMIN — INSULIN HUMAN 7 UNIT(S): 100 INJECTION, SOLUTION SUBCUTANEOUS at 17:13

## 2018-01-24 RX ADMIN — METFORMIN HYDROCHLORIDE 500 MILLIGRAM(S): 850 TABLET ORAL at 07:43

## 2018-01-24 RX ADMIN — Medication 1 TABLET(S): at 17:12

## 2018-01-24 RX ADMIN — AMLODIPINE BESYLATE 2.5 MILLIGRAM(S): 2.5 TABLET ORAL at 05:26

## 2018-01-24 RX ADMIN — INSULIN HUMAN 7 UNIT(S): 100 INJECTION, SOLUTION SUBCUTANEOUS at 07:43

## 2018-01-24 NOTE — PROGRESS NOTE ADULT - ASSESSMENT
58 y/o male with history of DM & HTN presented with sore throat and generalized weakness x 2 weeks and was found to have oral thrush and suspected  bacterial pharyngitis who has been found to have a DVT and pulmonary embolism.    DVT/PE  - cause unclear, as patient was on prophylactic lovenox  - patient is hemodynamically stable and denied chest pain or dyspnea  - US showed RIGHT posterior tibial and peroneal veins  - CTA showed bilateral pulmonary emboli  - started Eliquis  - patient will need coagulopathy work up once DVT has resolved    Possible pharyngitis with viral syndrome vs PNA  - CT showed Left lower lobe mucus plugging & nodular opacities that may be atelectasis or postobstructive pneumonia  - consult ID & check procalcitonin  - Blood cultures NGTD  - Rapid flu negative / RVP negative   - CXR on 1/18 showed no focal airspace opacities  - switch Levaquin 500mg to 750mg  - trend CBC & CRP    Thrush  - likely due to uncontrolled DM  - c/w nystatin   - HIV negative    Newly diagnosed hyperosmolar Diabetes  - c/w premeal finger sticks and insulin sliding scale coverage  - Hgb A1C = 15.5 on 1/19  - Endo following & added metformin  - c/w ISS, Lantus & prandial lispro     RLE edema  - check LE Doppler    PHILLIP   - likely prerenal from dehydration   - s/p about 2L IVF in ER   - now resolved    Hypertension  - c/w Amlodipine 2.5mg   - c/w low sodium diet    Prophylaxis:  DVT: prophylactic Lovenox switched to Eliquis  GI: PO diet

## 2018-01-24 NOTE — PROGRESS NOTE ADULT - SUBJECTIVE AND OBJECTIVE BOX
Patient is a 57y old  Male who presents with a chief complaint of sore throat and general weakness (2018 19:31)      Interval History: finger sticks are in high 100's   on Lantus 20 units and prandial lispro 7 units and Lispro sliding scale coverage with meals   decreasing glucose toxicity     MEDICATIONS  (STANDING):  amLODIPine   Tablet 2.5 milliGRAM(s) Oral daily  apixaban 10 milliGRAM(s) Oral every 12 hours  dextrose 5%. 1000 milliLiter(s) (50 mL/Hr) IV Continuous <Continuous>  dextrose 50% Injectable 12.5 Gram(s) IV Push once  dextrose 50% Injectable 25 Gram(s) IV Push once  dextrose 50% Injectable 25 Gram(s) IV Push once  docusate sodium 100 milliGRAM(s) Oral three times a day  insulin glargine Injectable (LANTUS) 20 Unit(s) SubCutaneous at bedtime  insulin lispro (HumaLOG) corrective regimen sliding scale   SubCutaneous Before meals and at bedtime  insulin regular  human recombinant 7 Unit(s) SubCutaneous before breakfast  insulin regular  human recombinant 7 Unit(s) SubCutaneous before lunch  insulin regular  human recombinant 7 Unit(s) SubCutaneous before dinner  levoFLOXacin  Tablet 750 milliGRAM(s) Oral every 24 hours  nystatin    Suspension 030897 Unit(s) Oral two times a day  pneumococcal  23 Vaccine (PNEUMOVAX) 0.5 milliLiter(s) IntraMuscular once  polyethylene glycol 3350 17 Gram(s) Oral daily  potassium acid phosphate/sodium acid phosphate tablet (K-PHOS No. 2) 1 Tablet(s) Oral four times a day with meals    MEDICATIONS  (PRN):  acetaminophen   Tablet 650 milliGRAM(s) Oral every 6 hours PRN For Temp greater than 38 C (100.4 F)  acetaminophen   Tablet. 650 milliGRAM(s) Oral every 6 hours PRN Mild Pain (1 - 3)  dextrose Gel 1 Dose(s) Oral once PRN Blood Glucose LESS THAN 70 milliGRAM(s)/deciliter  glucagon  Injectable 1 milliGRAM(s) IntraMuscular once PRN Glucose LESS THAN 70 milligrams/deciliter  senna 2 Tablet(s) Oral at bedtime PRN Constipation      Allergies    No Known Allergies    Intolerances        REVIEW OF SYSTEMS:  CONSTITUTIONAL:   EYES: No eye pain, visual disturbances, or discharge  ENMT:  No difficulty hearing, tinnitus, vertigo; No sinus or throat pain  NECK: No pain or stiffness  RESPIRATORY: No cough, wheezing, chills or hemoptysis; No shortness of breath  CARDIOVASCULAR: No chest pain, palpitations, dizziness, or leg swelling  GASTROINTESTINAL: No abdominal or epigastric pain. No nausea, vomiting, or hematemesis; No diarrhea or constipation. No melena or hematochezia.  GENITOURINARY: No dysuria, frequency, hematuria, or incontinence  NEUROLOGICAL: No headaches, memory loss, loss of strength, numbness, or tremors  SKIN: No itching, burning, rashes, or lesions   LYMPH NODES: No enlarged glands  ENDOCRINE: No heat or cold intolerance; No hair loss  MUSCULOSKELETAL: No joint pain or swelling; No muscle, back, or extremity pain  PSYCHIATRIC: No depression, anxiety, mood swings, or difficulty sleeping  HEME/LYMPH: No easy bruising, or bleeding gums  ALLERY AND IMMUNOLOGIC: No hives or eczema    Vital Signs Last 24 Hrs  T(C): 36.7 (2018 17:27), Max: 36.7 (2018 23:59)  T(F): 98.1 (2018 17:27), Max: 98.1 (2018 17:27)  HR: 81 (2018 17:27) (79 - 89)  BP: 131/75 (2018 17:27) (131/75 - 153/89)  BP(mean): --  RR: 17 (2018 17:27) (16 - 17)  SpO2: 96% (2018 17:27) (96% - 98%)    PHYSICAL EXAM:  GENERAL:   HEAD:  Atraumatic, Normocephalic  EYES: EOMI, PERRLA, conjunctiva and sclera clear  ENMT: No tonsillar erythema, exudates, or enlargement; Moist mucous membranes, Good dentition, No lesions  NECK: Supple, No JVD, Normal thyroid  NERVOUS SYSTEM:  Alert & Oriented X3, Good concentration; Motor Strength 5/5 B/L upper and lower extremities; DTRs 2+ intact and symmetric  CHEST/LUNG: Clear to percussion bilaterally; No rales, rhonchi, wheezing, or rubs  HEART: Regular rate and rhythm; No murmurs, rubs, or gallops  ABDOMEN: Soft, Nontender, Nondistended; Bowel sounds present  EXTREMITIES:  2+ Peripheral Pulses, No clubbing, cyanosis, or edema  SKIN: No rashes or lesions    LABS:      Urinalysis Basic - ( 2018 16:15 )    Color: Yellow / Appearance: Clear / S.005 / pH: x  Gluc: x / Ketone: Negative  / Bili: Negative / Urobili: 4 mg/dL   Blood: x / Protein: Negative mg/dL / Nitrite: Negative   Leuk Esterase: Negative / RBC: x / WBC x   Sq Epi: x / Non Sq Epi: x / Bacteria: x      CAPILLARY BLOOD GLUCOSE      POCT Blood Glucose.: 216 mg/dL (2018 16:12)  POCT Blood Glucose.: 133 mg/dL (2018 11:16)  POCT Blood Glucose.: 178 mg/dL (2018 07:28)  POCT Blood Glucose.: 357 mg/dL (2018 22:06)    Lipid panel:           Thyroid:  Diabetes Tests:  Parathyroid Panel:  Adrenals:  RADIOLOGY & ADDITIONAL TESTS:    Imaging Personally Reviewed:  [ ] YES  [ ] NO    Consultant(s) Notes Reviewed:  [ ] YES  [ ] NO    Care Discussed with Consultants/Other Providers [ ] YES  [ ] NO

## 2018-01-24 NOTE — PROGRESS NOTE ADULT - PROVIDER SPECIALTY LIST ADULT
Endocrinology
Hospitalist

## 2018-01-24 NOTE — PROGRESS NOTE ADULT - SUBJECTIVE AND OBJECTIVE BOX
57y old Male w/ history of HTN & DM who presents with a chief complaint of sore throat and general weakness  and was found to have oral thrush and suspected pharyngitis.    INTERVAL HPI/OVERNIGHT EVENTS:    MEDICATIONS  (STANDING):  amLODIPine   Tablet 2.5 milliGRAM(s) Oral daily  apixaban 10 milliGRAM(s) Oral every 12 hours  dextrose 5%. 1000 milliLiter(s) (50 mL/Hr) IV Continuous <Continuous>  dextrose 50% Injectable 12.5 Gram(s) IV Push once  dextrose 50% Injectable 25 Gram(s) IV Push once  dextrose 50% Injectable 25 Gram(s) IV Push once  docusate sodium 100 milliGRAM(s) Oral three times a day  insulin glargine Injectable (LANTUS) 20 Unit(s) SubCutaneous at bedtime  insulin lispro (HumaLOG) corrective regimen sliding scale   SubCutaneous Before meals and at bedtime  insulin regular  human recombinant 7 Unit(s) SubCutaneous before breakfast  insulin regular  human recombinant 7 Unit(s) SubCutaneous before lunch  insulin regular  human recombinant 7 Unit(s) SubCutaneous before dinner  levoFLOXacin  Tablet 750 milliGRAM(s) Oral every 24 hours  nystatin    Suspension 685014 Unit(s) Oral two times a day  pneumococcal  23 Vaccine (PNEUMOVAX) 0.5 milliLiter(s) IntraMuscular once  polyethylene glycol 3350 17 Gram(s) Oral daily  potassium acid phosphate/sodium acid phosphate tablet (K-PHOS No. 2) 1 Tablet(s) Oral four times a day with meals    MEDICATIONS  (PRN):  acetaminophen   Tablet 650 milliGRAM(s) Oral every 6 hours PRN For Temp greater than 38 C (100.4 F)  acetaminophen   Tablet. 650 milliGRAM(s) Oral every 6 hours PRN Mild Pain (1 - 3)  dextrose Gel 1 Dose(s) Oral once PRN Blood Glucose LESS THAN 70 milliGRAM(s)/deciliter  glucagon  Injectable 1 milliGRAM(s) IntraMuscular once PRN Glucose LESS THAN 70 milligrams/deciliter  senna 2 Tablet(s) Oral at bedtime PRN Constipation    Allergies    No Known Allergies    Intolerances    Vital Signs Last 24 Hrs  T(C): 36.7 (2018 17:27), Max: 36.7 (2018 23:59)  T(F): 98.1 (2018 17:27), Max: 98.1 (2018 17:27)  HR: 81 (2018 17:27) (79 - 89)  BP: 131/75 (2018 17:27) (131/75 - 153/89)  BP(mean): --  RR: 17 (2018 17:27) (16 - 17)  SpO2: 96% (2018 17:27) (96% - 98%)    PHYSICAL EXAM:  GENERAL: NAD, well-groomed, well-developed  HEAD:  Atraumatic, Normocephalic  EYES: EOMI, PERRLA, conjunctiva and sclera clear  NECK: Supple  NERVOUS SYSTEM:  Alert & Oriented X3  CHEST/LUNG: Clear to auscultation bilaterally; No rales, rhonchi, wheezing, or rubs  HEART: Regular rate and rhythm; No murmurs, rubs, or gallops  ABDOMEN: Soft, Nontender, Nondistended; Bowel sounds present  EXTREMITIES: RLE edema    LABS:                        11.3   12.14 )-----------( 294      ( 2018 07:45 )             35.0         139  |  104  |  11  ----------------------------<  188<H>  3.6   |  28  |  0.86    Ca    7.8<L>      2018 07:45  Phos  2.5       Mg     1.9     24        Urinalysis Basic - ( 2018 16:15 )    Color: Yellow / Appearance: Clear / S.005 / pH: x  Gluc: x / Ketone: Negative  / Bili: Negative / Urobili: 4 mg/dL   Blood: x / Protein: Negative mg/dL / Nitrite: Negative   Leuk Esterase: Negative / RBC: x / WBC x   Sq Epi: x / Non Sq Epi: x / Bacteria: x      CAPILLARY BLOOD GLUCOSE      POCT Blood Glucose.: 216 mg/dL (2018 16:12)  POCT Blood Glucose.: 133 mg/dL (2018 11:16)  POCT Blood Glucose.: 178 mg/dL (2018 07:28)  POCT Blood Glucose.: 357 mg/dL (2018 22:06)      RADIOLOGY & ADDITIONAL TESTS:

## 2018-01-24 NOTE — PROGRESS NOTE ADULT - PROBLEM SELECTOR PROBLEM 1
Pharyngitis with viral syndrome
Type 2 diabetes mellitus with hyperglycemia, without long-term current use of insulin
Pharyngitis with viral syndrome

## 2018-01-24 NOTE — PROGRESS NOTE ADULT - PROBLEM SELECTOR PLAN 1
- Admit to Medicine  - check BCx and UCx  - Rapid flu negative  - check RVP  - Start IV Levaquin 500mg  nystatin added for ?thrush
- Blood cultures NGTD  - urine culture < 10,000  normal urogenital pablo present  - Rapid flu negative / RVP negative   - chest xray 1/18: no focal airspace opacities  - Started empiric Levaquin 500mg  - nystatin added for ?thrush
Continue with the same regimen while inpatient   Check C-Peptide - Pending and if sufficient then add low dose Metformin
Continue with the same regimen while inpatient   patient can be discharged on the same regimen   addition of Metformin as out-patient will help
Continue with the same regimen while inpatient   stable diabetes   patient can be discharged on the same regimen   Patient should have strict diet control and do exercise as tolerated upon discharge
add Metformin   Check C-Peptide for documentation   Continue with the same regimen while inpatient

## 2018-01-25 VITALS
HEART RATE: 77 BPM | TEMPERATURE: 98 F | DIASTOLIC BLOOD PRESSURE: 84 MMHG | OXYGEN SATURATION: 98 % | SYSTOLIC BLOOD PRESSURE: 142 MMHG | RESPIRATION RATE: 18 BRPM

## 2018-01-25 LAB
ANION GAP SERPL CALC-SCNC: 11 MMOL/L — SIGNIFICANT CHANGE UP (ref 5–17)
BASOPHILS # BLD AUTO: 0.05 K/UL — SIGNIFICANT CHANGE UP (ref 0–0.2)
BASOPHILS NFR BLD AUTO: 0.4 % — SIGNIFICANT CHANGE UP (ref 0–2)
BUN SERPL-MCNC: 11 MG/DL — SIGNIFICANT CHANGE UP (ref 7–23)
CALCIUM SERPL-MCNC: 8.3 MG/DL — LOW (ref 8.5–10.1)
CHLORIDE SERPL-SCNC: 102 MMOL/L — SIGNIFICANT CHANGE UP (ref 96–108)
CO2 SERPL-SCNC: 25 MMOL/L — SIGNIFICANT CHANGE UP (ref 22–31)
CREAT SERPL-MCNC: 0.93 MG/DL — SIGNIFICANT CHANGE UP (ref 0.5–1.3)
CRP SERPL-MCNC: 8.5 MG/DL — HIGH (ref 0–0.4)
CULTURE RESULTS: NO GROWTH — SIGNIFICANT CHANGE UP
EOSINOPHIL # BLD AUTO: 0.06 K/UL — SIGNIFICANT CHANGE UP (ref 0–0.5)
EOSINOPHIL NFR BLD AUTO: 0.5 % — SIGNIFICANT CHANGE UP (ref 0–6)
GLUCOSE BLDC GLUCOMTR-MCNC: 154 MG/DL — HIGH (ref 70–99)
GLUCOSE BLDC GLUCOMTR-MCNC: 229 MG/DL — HIGH (ref 70–99)
GLUCOSE BLDC GLUCOMTR-MCNC: 305 MG/DL — HIGH (ref 70–99)
GLUCOSE SERPL-MCNC: 223 MG/DL — HIGH (ref 70–99)
HCT VFR BLD CALC: 33.5 % — LOW (ref 39–50)
HGB BLD-MCNC: 11 G/DL — LOW (ref 13–17)
IMM GRANULOCYTES NFR BLD AUTO: 4.2 % — HIGH (ref 0–1.5)
LYMPHOCYTES # BLD AUTO: 17.3 % — SIGNIFICANT CHANGE UP (ref 13–44)
LYMPHOCYTES # BLD AUTO: 2.02 K/UL — SIGNIFICANT CHANGE UP (ref 1–3.3)
MCHC RBC-ENTMCNC: 22.1 PG — LOW (ref 27–34)
MCHC RBC-ENTMCNC: 32.8 GM/DL — SIGNIFICANT CHANGE UP (ref 32–36)
MCV RBC AUTO: 67.4 FL — LOW (ref 80–100)
MONOCYTES # BLD AUTO: 1.32 K/UL — HIGH (ref 0–0.9)
MONOCYTES NFR BLD AUTO: 11.3 % — SIGNIFICANT CHANGE UP (ref 2–14)
NEUTROPHILS # BLD AUTO: 7.74 K/UL — HIGH (ref 1.8–7.4)
NEUTROPHILS NFR BLD AUTO: 66.3 % — SIGNIFICANT CHANGE UP (ref 43–77)
PHOSPHATE SERPL-MCNC: 3.2 MG/DL — SIGNIFICANT CHANGE UP (ref 2.5–4.5)
PLATELET # BLD AUTO: 305 K/UL — SIGNIFICANT CHANGE UP (ref 150–400)
POTASSIUM SERPL-MCNC: 3.4 MMOL/L — LOW (ref 3.5–5.3)
POTASSIUM SERPL-SCNC: 3.4 MMOL/L — LOW (ref 3.5–5.3)
PROCALCITONIN SERPL-MCNC: 0.13 NG/ML — HIGH (ref 0–0.04)
RBC # BLD: 4.97 M/UL — SIGNIFICANT CHANGE UP (ref 4.2–5.8)
RBC # FLD: 13.9 % — SIGNIFICANT CHANGE UP (ref 10.3–14.5)
SODIUM SERPL-SCNC: 138 MMOL/L — SIGNIFICANT CHANGE UP (ref 135–145)
SPECIMEN SOURCE: SIGNIFICANT CHANGE UP
WBC # BLD: 11.68 K/UL — HIGH (ref 3.8–10.5)
WBC # FLD AUTO: 11.68 K/UL — HIGH (ref 3.8–10.5)

## 2018-01-25 PROCEDURE — 99239 HOSP IP/OBS DSCHRG MGMT >30: CPT

## 2018-01-25 RX ORDER — METFORMIN HYDROCHLORIDE 850 MG/1
500 TABLET ORAL
Qty: 0 | Refills: 0 | Status: DISCONTINUED | OUTPATIENT
Start: 2018-01-25 | End: 2018-01-25

## 2018-01-25 RX ORDER — INSULIN GLARGINE 100 [IU]/ML
20 INJECTION, SOLUTION SUBCUTANEOUS
Qty: 5 | Refills: 0 | OUTPATIENT
Start: 2018-01-25 | End: 2018-02-14

## 2018-01-25 RX ORDER — APIXABAN 2.5 MG/1
2 TABLET, FILM COATED ORAL
Qty: 52 | Refills: 0 | OUTPATIENT
Start: 2018-01-25 | End: 2018-02-13

## 2018-01-25 RX ORDER — INSULIN HUMAN 100 [IU]/ML
7 INJECTION, SOLUTION SUBCUTANEOUS
Qty: 2 | Refills: 0 | OUTPATIENT
Start: 2018-01-25 | End: 2018-02-14

## 2018-01-25 RX ORDER — NYSTATIN 500MM UNIT
5 POWDER (EA) MISCELLANEOUS
Qty: 100 | Refills: 0 | OUTPATIENT
Start: 2018-01-25 | End: 2018-01-27

## 2018-01-25 RX ORDER — METFORMIN HYDROCHLORIDE 850 MG/1
1 TABLET ORAL
Qty: 42 | Refills: 0 | OUTPATIENT
Start: 2018-01-25 | End: 2018-02-14

## 2018-01-25 RX ORDER — POTASSIUM CHLORIDE 20 MEQ
40 PACKET (EA) ORAL ONCE
Qty: 0 | Refills: 0 | Status: COMPLETED | OUTPATIENT
Start: 2018-01-25 | End: 2018-01-25

## 2018-01-25 RX ADMIN — Medication 40 MILLIEQUIVALENT(S): at 09:17

## 2018-01-25 RX ADMIN — Medication 4: at 08:19

## 2018-01-25 RX ADMIN — Medication 8: at 11:29

## 2018-01-25 RX ADMIN — Medication 100 MILLIGRAM(S): at 05:57

## 2018-01-25 RX ADMIN — METFORMIN HYDROCHLORIDE 500 MILLIGRAM(S): 850 TABLET ORAL at 16:54

## 2018-01-25 RX ADMIN — AMLODIPINE BESYLATE 2.5 MILLIGRAM(S): 2.5 TABLET ORAL at 05:57

## 2018-01-25 RX ADMIN — Medication 100 MILLIGRAM(S): at 16:55

## 2018-01-25 RX ADMIN — Medication 500000 UNIT(S): at 11:28

## 2018-01-25 RX ADMIN — INSULIN HUMAN 7 UNIT(S): 100 INJECTION, SOLUTION SUBCUTANEOUS at 16:56

## 2018-01-25 RX ADMIN — INSULIN HUMAN 7 UNIT(S): 100 INJECTION, SOLUTION SUBCUTANEOUS at 11:27

## 2018-01-25 RX ADMIN — POLYETHYLENE GLYCOL 3350 17 GRAM(S): 17 POWDER, FOR SOLUTION ORAL at 11:29

## 2018-01-25 RX ADMIN — Medication 2: at 16:56

## 2018-01-25 RX ADMIN — Medication 1 TABLET(S): at 08:31

## 2018-01-25 RX ADMIN — APIXABAN 10 MILLIGRAM(S): 2.5 TABLET, FILM COATED ORAL at 17:00

## 2018-01-25 RX ADMIN — INSULIN HUMAN 7 UNIT(S): 100 INJECTION, SOLUTION SUBCUTANEOUS at 08:21

## 2018-01-25 RX ADMIN — APIXABAN 10 MILLIGRAM(S): 2.5 TABLET, FILM COATED ORAL at 05:57

## 2018-01-25 NOTE — DISCHARGE NOTE ADULT - NSTOBACCOHOTLINE_GEN_A_CS
[facility] Smokers Quitline ([phone]) [facility] Smokers Quitline ([phone]-UW-[phone])

## 2018-01-25 NOTE — DISCHARGE NOTE ADULT - PATIENT PORTAL LINK FT
“You can access the Follow[employer] Patient Portal, offered by [facility], by registering with the following website: [email or web address]”

## 2018-01-25 NOTE — DISCHARGE NOTE ADULT - CARE PLAN
Principal Discharge DX:	PHILLIP (acute kidney injury)  Goal:	resolved  Assessment and plan of treatment:	f/u with PMD  Secondary Diagnosis:	Hypernatremia  Goal:	resolved  Assessment and plan of treatment:	f/u with PMD  Secondary Diagnosis:	Type 2 diabetes mellitus with hyperglycemia, without long-term current use of insulin  Goal:	stable  Assessment and plan of treatment:	please continue meds as directed and f/u with Dr. carty within 2 weeks  Secondary Diagnosis:	Pharyngitis with viral syndrome  Goal:	stable  Secondary Diagnosis:	Oral thrush  Secondary Diagnosis:	Hypertension, unspecified type  Secondary Diagnosis:	Acute deep vein thrombosis (DVT) of popliteal vein of right lower extremity

## 2018-01-25 NOTE — DISCHARGE NOTE ADULT - HOSPITAL COURSE
58 y/o male with history of DM & HTN presented with sore throat and generalized weakness x 2 weeks and was found to have oral thrush and suspected  bacterial pharyngitis who has been found to have a DVT and pulmonary embolism.    DVT/PE  - cause unclear, as patient was on prophylactic lovenox  - patient is hemodynamically stable and denied chest pain or dyspnea  - US showed RIGHT posterior tibial and peroneal veins  - CTA showed bilateral pulmonary emboli  - started Eliquis  - patient will need coagulopathy work up once DVT has resolved    Possible pharyngitis with viral syndrome vs PNA  - CT showed Left lower lobe mucus plugging & nodular opacities that may be atelectasis or postobstructive pneumonia  - consult ID & check procalcitonin  - Blood cultures NGTD  - Rapid flu negative / RVP negative   - CXR on 1/18 showed no focal airspace opacities  - switch Levaquin 500mg to 750mg  - trend CBC & CRP    Thrush  - likely due to uncontrolled DM  - c/w nystatin   - HIV negative    Newly diagnosed hyperosmolar Diabetes  - c/w premeal finger sticks and insulin sliding scale coverage  - Hgb A1C = 15.5 on 1/19  - Endo following & added metformin  - c/w ISS, Lantus & prandial lispro     RLE edema  - check LE Doppler  PHILLIP   - likely prerenal from dehydration   - s/p about 2L IVF in ER   - now resolved    Hypertension  - c/w Amlodipine 2.5mg   - c/w low sodium diet    Prophylaxis:  DVT: prophylactic Lovenox switched to Eliquis  GI: PO diet 58 y/o male with history of DM & HTN presented with sore throat and generalized weakness x 2 weeks and was found to have oral thrush and suspected  bacterial pharyngitis who has been found to have a DVT and pulmonary embolism despite being on prophylactic Lovenox US showed RIGHT posterior tibial and peroneal veins & CTA showed bilateral pulmonary emboli. He has been started on Eliquis and is hemodynamically stable for discharge. Pt was initially started on Levaquin for suspected pharyngitis and nystatin powder was added for oral thrush. CTA showed Left lower lobe mucus plugging & nodular opacities that may be atelectasis or postobstructive pneumonia. His procalcitonin is 0.13 and his WBC has improved. As per ID, he has been discharged on 3 more days of Levaquin. During the admission, the pt's thrush was found to be due to newly diagnosed hyperosmolar Diabetes. His Hgb A1C was 15.5 on 1/19. As per Endo, he has been discharged on metformin, Lantus & prandial humulin.

## 2018-01-25 NOTE — DISCHARGE NOTE ADULT - CARE PROVIDER_API CALL
PMD,   Phone: (   )    -  Fax: (   )    -    Armando Castillo), EndocrinologyMetabDiabetes; Internal Medicine  [address]  [address]  [place] [address]  Phone: [phone]  Fax: [phone]

## 2018-01-25 NOTE — DISCHARGE NOTE ADULT - MEDICATION SUMMARY - MEDICATIONS TO TAKE
I will START or STAY ON the medications listed below when I get home from the hospital:    Eliquis 5 mg oral tablet  -- Take 2 tabs by mouth every 12 hours for 6 days then 1 Tab every 12 hours  -- Indication: For Acute deep vein thrombosis (DVT) of popliteal vein of right lower extremity    Glucophage 500 mg oral tablet  -- 1 tab(s) by mouth 2 times a day (with meals)  -- Indication: For Diabetes    Basaglar KwikPen 100 units/mL subcutaneous solution  -- 20 unit(s) subcutaneous once a day (at bedtime)   -- Do not drink alcoholic beverages when taking this medication.  It is very important that you take or use this exactly as directed.  Do not skip doses or discontinue unless directed by your doctor.  Keep in refrigerator.  Do not freeze.    -- Indication: For Diabetes    HumuLIN R KwikPen (Concentrated) human recombinant 500 units/mL subcutaneous solution  -- 7 unit(s) subcutaneous 3 times a day (with meals)   -- Do not drink alcoholic beverages when taking this medication.  It is very important that you take or use this exactly as directed.  Do not skip doses or discontinue unless directed by your doctor.  Keep in refrigerator.  Do not freeze.    -- Indication: For Diabetes    nystatin 100,000 units/mL oral suspension  -- 5 milliliter(s) by mouth 4 times a day   -- Indication: For Oral thrush    amLODIPine 2.5 mg oral tablet  -- 1 tab(s) by mouth once a day  -- Indication: For Hypertension    Levaquin 750 mg oral tablet  -- 1 tab(s) by mouth every 24 hours  -- Indication: For Pharyngitis with viral syndrome

## 2018-01-25 NOTE — DISCHARGE NOTE ADULT - SECONDARY DIAGNOSIS.
Hypernatremia Type 2 diabetes mellitus with hyperglycemia, without long-term current use of insulin Pharyngitis with viral syndrome Oral thrush Hypertension, unspecified type Acute deep vein thrombosis (DVT) of popliteal vein of right lower extremity

## 2018-01-26 ENCOUNTER — EMERGENCY (EMERGENCY)
Facility: HOSPITAL | Age: 58
LOS: 0 days | Discharge: HOME | End: 2018-01-27
Attending: EMERGENCY MEDICINE
Payer: COMMERCIAL

## 2018-01-26 VITALS
WEIGHT: 199.96 LBS | DIASTOLIC BLOOD PRESSURE: 94 MMHG | HEART RATE: 89 BPM | TEMPERATURE: 99 F | RESPIRATION RATE: 20 BRPM | HEIGHT: 66 IN | OXYGEN SATURATION: 98 % | SYSTOLIC BLOOD PRESSURE: 156 MMHG

## 2018-01-26 DIAGNOSIS — M79.661 PAIN IN RIGHT LOWER LEG: ICD-10-CM

## 2018-01-26 DIAGNOSIS — Z76.0 ENCOUNTER FOR ISSUE OF REPEAT PRESCRIPTION: ICD-10-CM

## 2018-01-26 DIAGNOSIS — Z79.4 LONG TERM (CURRENT) USE OF INSULIN: ICD-10-CM

## 2018-01-26 DIAGNOSIS — Z86.718 PERSONAL HISTORY OF OTHER VENOUS THROMBOSIS AND EMBOLISM: ICD-10-CM

## 2018-01-26 DIAGNOSIS — E11.65 TYPE 2 DIABETES MELLITUS WITH HYPERGLYCEMIA: ICD-10-CM

## 2018-01-26 PROCEDURE — 99284 EMERGENCY DEPT VISIT MOD MDM: CPT

## 2018-01-27 VITALS
HEART RATE: 89 BPM | OXYGEN SATURATION: 96 % | RESPIRATION RATE: 17 BRPM | TEMPERATURE: 98 F | SYSTOLIC BLOOD PRESSURE: 143 MMHG | DIASTOLIC BLOOD PRESSURE: 95 MMHG

## 2018-01-27 LAB
ACETONE SERPL-MCNC: NEGATIVE — SIGNIFICANT CHANGE UP
ACETONE SERPL-MCNC: NEGATIVE — SIGNIFICANT CHANGE UP
ALBUMIN SERPL ELPH-MCNC: 2.3 G/DL — LOW (ref 3.3–5)
ALP SERPL-CCNC: 95 U/L — SIGNIFICANT CHANGE UP (ref 40–120)
ALT FLD-CCNC: 107 U/L — HIGH (ref 12–78)
ANION GAP SERPL CALC-SCNC: 7 MMOL/L — SIGNIFICANT CHANGE UP (ref 5–17)
AST SERPL-CCNC: 74 U/L — HIGH (ref 15–37)
BASOPHILS # BLD AUTO: 0.03 K/UL — SIGNIFICANT CHANGE UP (ref 0–0.2)
BASOPHILS NFR BLD AUTO: 0.2 % — SIGNIFICANT CHANGE UP (ref 0–2)
BILIRUB SERPL-MCNC: 1.2 MG/DL — SIGNIFICANT CHANGE UP (ref 0.2–1.2)
BUN SERPL-MCNC: 12 MG/DL — SIGNIFICANT CHANGE UP (ref 7–23)
CALCIUM SERPL-MCNC: 8 MG/DL — LOW (ref 8.5–10.1)
CHLORIDE SERPL-SCNC: 103 MMOL/L — SIGNIFICANT CHANGE UP (ref 96–108)
CO2 SERPL-SCNC: 25 MMOL/L — SIGNIFICANT CHANGE UP (ref 22–31)
CREAT SERPL-MCNC: 0.88 MG/DL — SIGNIFICANT CHANGE UP (ref 0.5–1.3)
EOSINOPHIL # BLD AUTO: 0.06 K/UL — SIGNIFICANT CHANGE UP (ref 0–0.5)
EOSINOPHIL NFR BLD AUTO: 0.5 % — SIGNIFICANT CHANGE UP (ref 0–6)
GLUCOSE BLDC GLUCOMTR-MCNC: 269 MG/DL — HIGH (ref 70–99)
GLUCOSE SERPL-MCNC: 265 MG/DL — HIGH (ref 70–99)
HCT VFR BLD CALC: 33.4 % — LOW (ref 39–50)
HGB BLD-MCNC: 10.6 G/DL — LOW (ref 13–17)
IMM GRANULOCYTES NFR BLD AUTO: 2.1 % — HIGH (ref 0–1.5)
LYMPHOCYTES # BLD AUTO: 18.4 % — SIGNIFICANT CHANGE UP (ref 13–44)
LYMPHOCYTES # BLD AUTO: 2.22 K/UL — SIGNIFICANT CHANGE UP (ref 1–3.3)
MCHC RBC-ENTMCNC: 22.3 PG — LOW (ref 27–34)
MCHC RBC-ENTMCNC: 31.7 GM/DL — LOW (ref 32–36)
MCV RBC AUTO: 70.2 FL — LOW (ref 80–100)
MONOCYTES # BLD AUTO: 0.98 K/UL — HIGH (ref 0–0.9)
MONOCYTES NFR BLD AUTO: 8.1 % — SIGNIFICANT CHANGE UP (ref 2–14)
NEUTROPHILS # BLD AUTO: 8.51 K/UL — HIGH (ref 1.8–7.4)
NEUTROPHILS NFR BLD AUTO: 70.7 % — SIGNIFICANT CHANGE UP (ref 43–77)
NRBC # BLD: 0 /100 WBCS — SIGNIFICANT CHANGE UP (ref 0–0)
PLATELET # BLD AUTO: 362 K/UL — SIGNIFICANT CHANGE UP (ref 150–400)
POTASSIUM SERPL-MCNC: 4.3 MMOL/L — SIGNIFICANT CHANGE UP (ref 3.5–5.3)
POTASSIUM SERPL-SCNC: 4.3 MMOL/L — SIGNIFICANT CHANGE UP (ref 3.5–5.3)
PROT SERPL-MCNC: 6.9 GM/DL — SIGNIFICANT CHANGE UP (ref 6–8.3)
RBC # BLD: 4.76 M/UL — SIGNIFICANT CHANGE UP (ref 4.2–5.8)
RBC # FLD: 15.2 % — HIGH (ref 10.3–14.5)
SODIUM SERPL-SCNC: 135 MMOL/L — SIGNIFICANT CHANGE UP (ref 135–145)
WBC # BLD: 12.05 K/UL — HIGH (ref 3.8–10.5)
WBC # FLD AUTO: 12.05 K/UL — HIGH (ref 3.8–10.5)

## 2018-01-27 RX ORDER — APIXABAN 2.5 MG/1
10 TABLET, FILM COATED ORAL ONCE
Qty: 0 | Refills: 0 | Status: COMPLETED | OUTPATIENT
Start: 2018-01-27 | End: 2018-01-27

## 2018-01-27 RX ORDER — METFORMIN HYDROCHLORIDE 850 MG/1
500 TABLET ORAL ONCE
Qty: 0 | Refills: 0 | Status: COMPLETED | OUTPATIENT
Start: 2018-01-27 | End: 2018-01-27

## 2018-01-27 RX ORDER — INSULIN HUMAN 100 [IU]/ML
7 INJECTION, SOLUTION SUBCUTANEOUS
Qty: 6 | Refills: 0 | OUTPATIENT
Start: 2018-01-27 | End: 2018-02-25

## 2018-01-27 RX ORDER — INSULIN GLARGINE 100 [IU]/ML
20 INJECTION, SOLUTION SUBCUTANEOUS
Qty: 15 | Refills: 0 | OUTPATIENT
Start: 2018-01-27 | End: 2018-02-25

## 2018-01-27 RX ORDER — METFORMIN HYDROCHLORIDE 850 MG/1
1 TABLET ORAL
Qty: 60 | Refills: 0 | OUTPATIENT
Start: 2018-01-27 | End: 2018-02-25

## 2018-01-27 RX ORDER — AMLODIPINE BESYLATE 2.5 MG/1
1 TABLET ORAL
Qty: 0 | Refills: 0 | COMMUNITY

## 2018-01-27 RX ORDER — CIPROFLOXACIN LACTATE 400MG/40ML
1 VIAL (ML) INTRAVENOUS
Qty: 2 | Refills: 0 | OUTPATIENT
Start: 2018-01-27 | End: 2018-01-28

## 2018-01-27 RX ORDER — SODIUM CHLORIDE 9 MG/ML
1000 INJECTION INTRAMUSCULAR; INTRAVENOUS; SUBCUTANEOUS ONCE
Qty: 0 | Refills: 0 | Status: COMPLETED | OUTPATIENT
Start: 2018-01-27 | End: 2018-01-27

## 2018-01-27 RX ORDER — APIXABAN 2.5 MG/1
1 TABLET, FILM COATED ORAL
Qty: 60 | Refills: 0 | OUTPATIENT
Start: 2018-01-27 | End: 2018-02-25

## 2018-01-27 RX ADMIN — METFORMIN HYDROCHLORIDE 500 MILLIGRAM(S): 850 TABLET ORAL at 03:42

## 2018-01-27 RX ADMIN — SODIUM CHLORIDE 1000 MILLILITER(S): 9 INJECTION INTRAMUSCULAR; INTRAVENOUS; SUBCUTANEOUS at 04:39

## 2018-01-27 RX ADMIN — APIXABAN 10 MILLIGRAM(S): 2.5 TABLET, FILM COATED ORAL at 03:42

## 2018-01-27 NOTE — ED ADULT NURSE NOTE - OBJECTIVE STATEMENT
PT STATES " MY INSURANCE RAN OUT SO MY MD TOLD ME TO COME TO ED, AND HE GAVE ME A LETTER TO SHOW THE ED MD."

## 2018-01-27 NOTE — ED PROVIDER NOTE - MEDICAL DECISION MAKING DETAILS
Patient with recent admission for DM, unable to obtain scripts.  VSS, labs with mild hyperglycemia, no clinical signs of DKA.  Generic scripts were sent to pharmacy and patient given single doses of antibiotic, DM, and anticoagulation meds. Discussed results and outcome of today's visit with the patient.  Patient advised to please follow up with another healthcare provider within the next 24 hours and return to the Emergency Department for worsening symptoms or any other concerns.  Patient advised that their doctor may call  to follow up on the specific results of the tests performed today in the emergency department.   Patient appears well on discharge. Patient with recent admission for DM, unable to obtain scripts.  VSS, labs with mild hyperglycemia, no clinical signs of DKA.  No interval changes in labs or symptoms.  Generic scripts were sent to pharmacy and patient given single doses of antibiotic, DM, and anticoagulation meds. Discussed results and outcome of today's visit with the patient.  Patient advised to please follow up with another healthcare provider within the next 24 hours and return to the Emergency Department for worsening symptoms or any other concerns.  Patient advised that their doctor may call  to follow up on the specific results of the tests performed today in the emergency department.   Patient appears well on discharge.

## 2018-01-27 NOTE — CHART NOTE - NSCHARTNOTEFT_GEN_A_CORE
I was called by Mr. Avendaño's pharmacy yesterday and informed that he was unable to fill his insulin and Eliquis scripts because of insurance issues. When the staff here checked with their insurance before his discharge, the insurance employees they spoke with said it would not be a problem. I have called him multiple times yesterday and today and have left multiple messages on his answering machine as he has never picked up. A  was also dispatched to his house yesterday to ask him to come to the ED and get anticoagulated. He was to deliver a letter that I wrote, asking the Mr. Avendaño to come to the ED without delay. 1/27/18:  I was called by Mr. Avendaño's pharmacy yesterday and informed that he was unable to fill his insulin and Eliquis scripts because of insurance issues. When the staff here checked with their insurance before his discharge, the insurance employees they spoke with said it would not be a problem. I called him multiple times yesterday and today and have left multiple messages on his answering machine as he has never picked up. A  was also dispatched to his house yesterday to ask him to come to the ED and get anticoagulated. He was to deliver a letter that I wrote, asking the Mr. Avendaño to come to the ED without delay. I have called today and left another message on his answering machine.     1/28/18: I called Mr. Avendaño's house again today, but he did not  and his voice mail was full.

## 2018-01-27 NOTE — ED PROVIDER NOTE - PHYSICAL EXAMINATION
Gen: Alert, NAD, well appearing  Head: NC, AT, EOMI, normal lids/conjunctiva  ENT: normal hearing, patent oropharynx without erythema/exudate, uvula midline  Neck: +supple, no tenderness/meningismus/JVD, +Trachea midline  Pulm: Bilateral BS, normal resp effort, no wheeze/stridor/retractions  CV: RRR, no M/R/G, +dist pulses  Abd: soft, NT/ND, +BS, no hepatosplenomegaly  Mskel: no edema/erythema/cyanosis  Skin: no rash, warm/dry  Neuro: AAOx3, no sensory/motor deficits, CN 2-12 intact Gen: Alert, NAD, well appearing  Head: NC, AT, EOMI, normal lids/conjunctiva  ENT: normal hearing, patent oropharynx without erythema/exudate, uvula midline  Neck: +supple, no tenderness/meningismus/JVD, +Trachea midline  Pulm: Bilateral BS, normal resp effort, no wheeze/stridor/retractions  CV: RRR, no M/R/G, +dist pulses  Abd: soft, NT/ND, +BS, no hepatosplenomegaly  Mskel: no edema/erythema/cyanosis  Skin: no rash, warm/dry  Neuro: AAOx3, no sensory/motor deficits

## 2018-01-27 NOTE — ED PROVIDER NOTE - OBJECTIVE STATEMENT
Pertinent PMH/PSH/FHx/SHx and Review of Systems contained within:  Patient presents to the ED for medication problem.  Patient was recently admitted to [facility] for multiple issues involving pharyngitis, hyperglycemia, DVT/PE.  Patient was discharged 24 hours ago and prescribed multiple medications which were sent to pharmacy for diabetes, anticoagulation, and antibiotic.  Says that he was not able to fill scripts due to high cost.  Prescribing physician Dr. Bustamante sent patient letter for call back in order to correct his medication issue.  Patient says that he has insurance, but costs were not well covered.  Patient says that no other changes have occurred since his discharge and he feels well.    Relevant PMHx/SHx/SOCHx/FAMH:  DM, DVT/PE  Patient denies EtOH/tobacco/illicit substance use.  No PMD    ROS: No fever/chills, No headache/photophobia/eye pain/changes in vision, No ear pain/sore throat/dysphagia, No chest pain/palpitations, no SOB/cough/wheeze/stridor, No abdominal pain, No N/V/D/melena, no dysuria/frequency/discharge, No neck/back pain, no rash, no changes in neurological status/function.

## 2018-01-30 DIAGNOSIS — E87.6 HYPOKALEMIA: ICD-10-CM

## 2018-01-30 DIAGNOSIS — R53.1 WEAKNESS: ICD-10-CM

## 2018-01-30 DIAGNOSIS — B37.0 CANDIDAL STOMATITIS: ICD-10-CM

## 2018-01-30 DIAGNOSIS — I82.441 ACUTE EMBOLISM AND THROMBOSIS OF RIGHT TIBIAL VEIN: ICD-10-CM

## 2018-01-30 DIAGNOSIS — I82.491 ACUTE EMBOLISM AND THROMBOSIS OF OTHER SPECIFIED DEEP VEIN OF RIGHT LOWER EXTREMITY: ICD-10-CM

## 2018-01-30 DIAGNOSIS — I26.99 OTHER PULMONARY EMBOLISM WITHOUT ACUTE COR PULMONALE: ICD-10-CM

## 2018-01-30 DIAGNOSIS — Z79.01 LONG TERM (CURRENT) USE OF ANTICOAGULANTS: ICD-10-CM

## 2018-01-30 DIAGNOSIS — I10 ESSENTIAL (PRIMARY) HYPERTENSION: ICD-10-CM

## 2018-01-30 DIAGNOSIS — J02.8 ACUTE PHARYNGITIS DUE TO OTHER SPECIFIED ORGANISMS: ICD-10-CM

## 2018-01-30 DIAGNOSIS — E11.00 TYPE 2 DIABETES MELLITUS WITH HYPEROSMOLARITY WITHOUT NONKETOTIC HYPERGLYCEMIC-HYPEROSMOLAR COMA (NKHHC): ICD-10-CM

## 2018-01-30 DIAGNOSIS — E83.39 OTHER DISORDERS OF PHOSPHORUS METABOLISM: ICD-10-CM

## 2018-01-30 DIAGNOSIS — N17.9 ACUTE KIDNEY FAILURE, UNSPECIFIED: ICD-10-CM

## 2018-01-30 DIAGNOSIS — E87.0 HYPEROSMOLALITY AND HYPERNATREMIA: ICD-10-CM

## 2018-01-30 DIAGNOSIS — R60.0 LOCALIZED EDEMA: ICD-10-CM

## 2018-01-30 DIAGNOSIS — E11.65 TYPE 2 DIABETES MELLITUS WITH HYPERGLYCEMIA: ICD-10-CM

## 2018-01-30 DIAGNOSIS — Z28.21 IMMUNIZATION NOT CARRIED OUT BECAUSE OF PATIENT REFUSAL: ICD-10-CM

## 2018-01-30 DIAGNOSIS — E11.638 TYPE 2 DIABETES MELLITUS WITH OTHER ORAL COMPLICATIONS: ICD-10-CM

## 2022-02-11 NOTE — ED ADULT NURSE NOTE - NS ED NOTE ABUSE RESPONSE YN
Detail Level: Simple Bactrim Counseling:  I discussed with the patient the risks of sulfa antibiotics including but not limited to GI upset, allergic reaction, drug rash, diarrhea, dizziness, photosensitivity, and yeast infections.  Rarely, more serious reactions can occur including but not limited to aplastic anemia, agranulocytosis, methemoglobinemia, blood dyscrasias, liver or kidney failure, lung infiltrates or desquamative/blistering drug rashes. Topical Sulfur Applications Pregnancy And Lactation Text: This medication is Pregnancy Category C and has an unknown safety profile during pregnancy. It is unknown if this topical medication is excreted in breast milk. Sarecycline Pregnancy And Lactation Text: This medication is Pregnancy Category D and not consider safe during pregnancy. It is also excreted in breast milk. Benzoyl Peroxide Counseling: Patient counseled that medicine may cause skin irritation and bleach clothing.  In the event of skin irritation, the patient was advised to reduce the amount of the drug applied or use it less frequently.   The patient verbalized understanding of the proper use and possible adverse effects of benzoyl peroxide.  All of the patient's questions and concerns were addressed. High Dose Vitamin A Pregnancy And Lactation Text: High dose vitamin A therapy is contraindicated during pregnancy and breast feeding. Dapsone Pregnancy And Lactation Text: This medication is Pregnancy Category C and is not considered safe during pregnancy or breast feeding. Tazorac Pregnancy And Lactation Text: This medication is not safe during pregnancy. It is unknown if this medication is excreted in breast milk. Bactrim Pregnancy And Lactation Text: This medication is Pregnancy Category D and is known to cause fetal risk.  It is also excreted in breast milk. Include Pregnancy/Lactation Warning?: No Azelaic Acid Counseling: Patient counseled that medicine may cause skin irritation and to avoid applying near the eyes.  In the event of skin irritation, the patient was advised to reduce the amount of the drug applied or use it less frequently.   The patient verbalized understanding of the proper use and possible adverse effects of azelaic acid.  All of the patient's questions and concerns were addressed. Spironolactone Counseling: Patient advised regarding risks of diarrhea, abdominal pain, hyperkalemia, birth defects (for female patients), liver toxicity and renal toxicity. The patient may need blood work to monitor liver and kidney function and potassium levels while on therapy. The patient verbalized understanding of the proper use and possible adverse effects of spironolactone.  All of the patient's questions and concerns were addressed. Erythromycin Pregnancy And Lactation Text: This medication is Pregnancy Category B and is considered safe during pregnancy. It is also excreted in breast milk. Benzoyl Peroxide Pregnancy And Lactation Text: This medication is Pregnancy Category C. It is unknown if benzoyl peroxide is excreted in breast milk. Minocycline Counseling: Patient advised regarding possible photosensitivity and discoloration of the teeth, skin, lips, tongue and gums.  Patient instructed to avoid sunlight, if possible.  When exposed to sunlight, patients should wear protective clothing, sunglasses, and sunscreen.  The patient was instructed to call the office immediately if the following severe adverse effects occur:  hearing changes, easy bruising/bleeding, severe headache, or vision changes.  The patient verbalized understanding of the proper use and possible adverse effects of minocycline.  All of the patient's questions and concerns were addressed. Topical Clindamycin Counseling: Patient counseled that this medication may cause skin irritation or allergic reactions.  In the event of skin irritation, the patient was advised to reduce the amount of the drug applied or use it less frequently.   The patient verbalized understanding of the proper use and possible adverse effects of clindamycin.  All of the patient's questions and concerns were addressed. Spironolactone Pregnancy And Lactation Text: This medication can cause feminization of the male fetus and should be avoided during pregnancy. The active metabolite is also found in breast milk. Detail Level: Zone Topical Retinoid counseling:  Patient advised to apply a pea-sized amount only at bedtime and wait 30 minutes after washing their face before applying.  If too drying, patient may add a non-comedogenic moisturizer. The patient verbalized understanding of the proper use and possible adverse effects of retinoids.  All of the patient's questions and concerns were addressed. Isotretinoin Counseling: Patient should get monthly blood tests, not donate blood, not drive at night if vision affected, not share medication, and not undergo elective surgery for 6 months after tx completed. Side effects reviewed, pt to contact office should one occur. Birth Control Pills Counseling: Birth Control Pill Counseling: I discussed with the patient the potential side effects of OCPs including but not limited to increased risk of stroke, heart attack, thrombophlebitis, deep venous thrombosis, hepatic adenomas, breast changes, GI upset, headaches, and depression.  The patient verbalized understanding of the proper use and possible adverse effects of OCPs. All of the patient's questions and concerns were addressed. Azelaic Acid Pregnancy And Lactation Text: This medication is considered safe during pregnancy and breast feeding. Doxycycline Counseling:  Patient counseled regarding possible photosensitivity and increased risk for sunburn.  Patient instructed to avoid sunlight, if possible.  When exposed to sunlight, patients should wear protective clothing, sunglasses, and sunscreen.  The patient was instructed to call the office immediately if the following severe adverse effects occur:  hearing changes, easy bruising/bleeding, severe headache, or vision changes.  The patient verbalized understanding of the proper use and possible adverse effects of doxycycline.  All of the patient's questions and concerns were addressed. Azithromycin Counseling:  I discussed with the patient the risks of azithromycin including but not limited to GI upset, allergic reaction, drug rash, diarrhea, and yeast infections. Topical Clindamycin Pregnancy And Lactation Text: This medication is Pregnancy Category B and is considered safe during pregnancy. It is unknown if it is excreted in breast milk. Tetracycline Counseling: Patient counseled regarding possible photosensitivity and increased risk for sunburn.  Patient instructed to avoid sunlight, if possible.  When exposed to sunlight, patients should wear protective clothing, sunglasses, and sunscreen.  The patient was instructed to call the office immediately if the following severe adverse effects occur:  hearing changes, easy bruising/bleeding, severe headache, or vision changes.  The patient verbalized understanding of the proper use and possible adverse effects of tetracycline.  All of the patient's questions and concerns were addressed. Patient understands to avoid pregnancy while on therapy due to potential birth defects. Isotretinoin Pregnancy And Lactation Text: This medication is Pregnancy Category X and is considered extremely dangerous during pregnancy. It is unknown if it is excreted in breast milk. Sarecycline Counseling: Patient advised regarding possible photosensitivity and discoloration of the teeth, skin, lips, tongue and gums.  Patient instructed to avoid sunlight, if possible.  When exposed to sunlight, patients should wear protective clothing, sunglasses, and sunscreen.  The patient was instructed to call the office immediately if the following severe adverse effects occur:  hearing changes, easy bruising/bleeding, severe headache, or vision changes.  The patient verbalized understanding of the proper use and possible adverse effects of sarecycline.  All of the patient's questions and concerns were addressed. Birth Control Pills Pregnancy And Lactation Text: This medication should be avoided if pregnant and for the first 30 days post-partum. Winlevi Counseling:  I discussed with the patient the risks of topical clascoterone including but not limited to erythema, scaling, itching, and stinging. Patient voiced their understanding. Doxycycline Pregnancy And Lactation Text: This medication is Pregnancy Category D and not consider safe during pregnancy. It is also excreted in breast milk but is considered safe for shorter treatment courses. Topical Retinoid Pregnancy And Lactation Text: This medication is Pregnancy Category C. It is unknown if this medication is excreted in breast milk. Azithromycin Pregnancy And Lactation Text: This medication is considered safe during pregnancy and is also secreted in breast milk. Topical Sulfur Applications Counseling: Topical Sulfur Counseling: Patient counseled that this medication may cause skin irritation or allergic reactions.  In the event of skin irritation, the patient was advised to reduce the amount of the drug applied or use it less frequently.   The patient verbalized understanding of the proper use and possible adverse effects of topical sulfur application.  All of the patient's questions and concerns were addressed. High Dose Vitamin A Counseling: Side effects reviewed, pt to contact office should one occur. Dapsone Counseling: I discussed with the patient the risks of dapsone including but not limited to hemolytic anemia, agranulocytosis, rashes, methemoglobinemia, kidney failure, peripheral neuropathy, headaches, GI upset, and liver toxicity.  Patients who start dapsone require monitoring including baseline LFTs and weekly CBCs for the first month, then every month thereafter.  The patient verbalized understanding of the proper use and possible adverse effects of dapsone.  All of the patient's questions and concerns were addressed. Winlevi Pregnancy And Lactation Text: This medication is considered safe during pregnancy and breastfeeding. Erythromycin Counseling:  I discussed with the patient the risks of erythromycin including but not limited to GI upset, allergic reaction, drug rash, diarrhea, increase in liver enzymes, and yeast infections. Yes Tazorac Counseling:  Patient advised that medication is irritating and drying.  Patient may need to apply sparingly and wash off after an hour before eventually leaving it on overnight.  The patient verbalized understanding of the proper use and possible adverse effects of tazorac.  All of the patient's questions and concerns were addressed.

## 2024-02-02 NOTE — PATIENT PROFILE ADULT. - WHEN FALL OCCURRED
How Severe Are Your Spot(S)?: mild
What Type Of Note Output Would You Prefer (Optional)?: Standard Output
What Is The Reason For Today's Visit?: Full Body Skin Examination with No Concerns
What Is The Reason For Today's Visit? (Being Monitored For X): the development of new lesions
N/A

## 2024-10-21 DIAGNOSIS — M54.50 LOW BACK PAIN, UNSPECIFIED: ICD-10-CM

## 2024-10-21 PROBLEM — Z00.00 ENCOUNTER FOR PREVENTIVE HEALTH EXAMINATION: Status: ACTIVE | Noted: 2024-10-21

## 2024-10-22 ENCOUNTER — APPOINTMENT (OUTPATIENT)
Dept: ORTHOPEDIC SURGERY | Facility: CLINIC | Age: 64
End: 2024-10-22
Payer: COMMERCIAL

## 2024-10-22 VITALS — BODY MASS INDEX: 30.37 KG/M2 | WEIGHT: 189 LBS | HEIGHT: 66 IN

## 2024-10-22 DIAGNOSIS — M51.369: ICD-10-CM

## 2024-10-22 DIAGNOSIS — Z78.9 OTHER SPECIFIED HEALTH STATUS: ICD-10-CM

## 2024-10-22 DIAGNOSIS — M47.814 SPONDYLOSIS W/OUT MYELOPATHY OR RADICULOPATHY, THORACIC REGION: ICD-10-CM

## 2024-10-22 PROCEDURE — 99204 OFFICE O/P NEW MOD 45 MIN: CPT

## 2024-10-22 RX ORDER — AMLODIPINE BESYLATE 5 MG/1
TABLET ORAL
Refills: 0 | Status: ACTIVE | COMMUNITY

## 2024-10-23 RX ORDER — MELOXICAM 15 MG/1
15 TABLET ORAL
Qty: 30 | Refills: 1 | Status: ACTIVE | COMMUNITY
Start: 2024-10-22 | End: 1900-01-01